# Patient Record
Sex: FEMALE | URBAN - METROPOLITAN AREA
[De-identification: names, ages, dates, MRNs, and addresses within clinical notes are randomized per-mention and may not be internally consistent; named-entity substitution may affect disease eponyms.]

---

## 2023-08-29 ENCOUNTER — EMERGENCY (EMERGENCY)
Facility: HOSPITAL | Age: 38
LOS: 1 days | Discharge: HOME | End: 2023-08-29
Attending: EMERGENCY MEDICINE | Admitting: EMERGENCY MEDICINE
Payer: OTHER MISCELLANEOUS

## 2023-08-29 VITALS
SYSTOLIC BLOOD PRESSURE: 133 MMHG | OXYGEN SATURATION: 98 % | DIASTOLIC BLOOD PRESSURE: 79 MMHG | HEART RATE: 53 BPM | RESPIRATION RATE: 17 BRPM | TEMPERATURE: 99 F

## 2023-08-29 VITALS — HEART RATE: 43 BPM

## 2023-08-29 LAB
ALBUMIN SERPL ELPH-MCNC: 3.7 G/DL — SIGNIFICANT CHANGE UP (ref 3.4–5)
ALP SERPL-CCNC: 102 U/L — SIGNIFICANT CHANGE UP (ref 40–120)
ALT FLD-CCNC: 23 U/L — SIGNIFICANT CHANGE UP (ref 12–42)
AMPHET UR-MCNC: NEGATIVE — SIGNIFICANT CHANGE UP
ANION GAP SERPL CALC-SCNC: 7 MMOL/L — LOW (ref 9–16)
APAP SERPL-MCNC: <2 UG/ML — LOW (ref 10–30)
APPEARANCE UR: CLEAR — SIGNIFICANT CHANGE UP
APTT BLD: 31.2 SEC — SIGNIFICANT CHANGE UP (ref 24.5–35.6)
AST SERPL-CCNC: 18 U/L — SIGNIFICANT CHANGE UP (ref 15–37)
BARBITURATES UR SCN-MCNC: NEGATIVE — SIGNIFICANT CHANGE UP
BASOPHILS # BLD AUTO: 0.04 K/UL — SIGNIFICANT CHANGE UP (ref 0–0.2)
BASOPHILS NFR BLD AUTO: 0.6 % — SIGNIFICANT CHANGE UP (ref 0–2)
BENZODIAZ UR-MCNC: NEGATIVE — SIGNIFICANT CHANGE UP
BILIRUB SERPL-MCNC: 0.4 MG/DL — SIGNIFICANT CHANGE UP (ref 0.2–1.2)
BILIRUB UR-MCNC: NEGATIVE — SIGNIFICANT CHANGE UP
BUN SERPL-MCNC: 11 MG/DL — SIGNIFICANT CHANGE UP (ref 7–23)
CALCIUM SERPL-MCNC: 9.1 MG/DL — SIGNIFICANT CHANGE UP (ref 8.5–10.5)
CHLORIDE SERPL-SCNC: 105 MMOL/L — SIGNIFICANT CHANGE UP (ref 96–108)
CO2 SERPL-SCNC: 28 MMOL/L — SIGNIFICANT CHANGE UP (ref 22–31)
COCAINE METAB.OTHER UR-MCNC: NEGATIVE — SIGNIFICANT CHANGE UP
COLOR SPEC: YELLOW — SIGNIFICANT CHANGE UP
CREAT SERPL-MCNC: 0.72 MG/DL — SIGNIFICANT CHANGE UP (ref 0.5–1.3)
DIFF PNL FLD: NEGATIVE — SIGNIFICANT CHANGE UP
EGFR: 108 ML/MIN/1.73M2 — SIGNIFICANT CHANGE UP
EOSINOPHIL # BLD AUTO: 0.11 K/UL — SIGNIFICANT CHANGE UP (ref 0–0.5)
EOSINOPHIL NFR BLD AUTO: 1.8 % — SIGNIFICANT CHANGE UP (ref 0–6)
ETHANOL SERPL-MCNC: <3 MG/DL — SIGNIFICANT CHANGE UP
FLUAV AG NPH QL: SIGNIFICANT CHANGE UP
FLUBV AG NPH QL: SIGNIFICANT CHANGE UP
GLUCOSE SERPL-MCNC: 80 MG/DL — SIGNIFICANT CHANGE UP (ref 70–99)
GLUCOSE UR QL: NEGATIVE MG/DL — SIGNIFICANT CHANGE UP
HCG SERPL-ACNC: <1 MIU/ML — SIGNIFICANT CHANGE UP
HCT VFR BLD CALC: 38.4 % — SIGNIFICANT CHANGE UP (ref 34.5–45)
HGB BLD-MCNC: 12.7 G/DL — SIGNIFICANT CHANGE UP (ref 11.5–15.5)
HIV 1 & 2 AB SERPL IA.RAPID: SIGNIFICANT CHANGE UP
IMM GRANULOCYTES NFR BLD AUTO: 0.3 % — SIGNIFICANT CHANGE UP (ref 0–0.9)
INR BLD: 1.01 — SIGNIFICANT CHANGE UP (ref 0.85–1.18)
KETONES UR-MCNC: NEGATIVE MG/DL — SIGNIFICANT CHANGE UP
LEUKOCYTE ESTERASE UR-ACNC: NEGATIVE — SIGNIFICANT CHANGE UP
LYMPHOCYTES # BLD AUTO: 2.74 K/UL — SIGNIFICANT CHANGE UP (ref 1–3.3)
LYMPHOCYTES # BLD AUTO: 44.3 % — HIGH (ref 13–44)
MAGNESIUM SERPL-MCNC: 2 MG/DL — SIGNIFICANT CHANGE UP (ref 1.6–2.6)
MCHC RBC-ENTMCNC: 29.9 PG — SIGNIFICANT CHANGE UP (ref 27–34)
MCHC RBC-ENTMCNC: 33.1 GM/DL — SIGNIFICANT CHANGE UP (ref 32–36)
MCV RBC AUTO: 90.4 FL — SIGNIFICANT CHANGE UP (ref 80–100)
METHADONE UR-MCNC: NEGATIVE — SIGNIFICANT CHANGE UP
MONOCYTES # BLD AUTO: 0.42 K/UL — SIGNIFICANT CHANGE UP (ref 0–0.9)
MONOCYTES NFR BLD AUTO: 6.8 % — SIGNIFICANT CHANGE UP (ref 2–14)
NEUTROPHILS # BLD AUTO: 2.85 K/UL — SIGNIFICANT CHANGE UP (ref 1.8–7.4)
NEUTROPHILS NFR BLD AUTO: 46.2 % — SIGNIFICANT CHANGE UP (ref 43–77)
NITRITE UR-MCNC: NEGATIVE — SIGNIFICANT CHANGE UP
NRBC # BLD: 0 /100 WBCS — SIGNIFICANT CHANGE UP (ref 0–0)
OPIATES UR-MCNC: NEGATIVE — SIGNIFICANT CHANGE UP
PCP SPEC-MCNC: SIGNIFICANT CHANGE UP
PCP UR-MCNC: NEGATIVE — SIGNIFICANT CHANGE UP
PH UR: 7.5 — SIGNIFICANT CHANGE UP (ref 5–8)
PHOSPHATE SERPL-MCNC: 2.8 MG/DL — SIGNIFICANT CHANGE UP (ref 2.5–4.5)
PLATELET # BLD AUTO: 259 K/UL — SIGNIFICANT CHANGE UP (ref 150–400)
POTASSIUM SERPL-MCNC: 3.9 MMOL/L — SIGNIFICANT CHANGE UP (ref 3.5–5.3)
POTASSIUM SERPL-SCNC: 3.9 MMOL/L — SIGNIFICANT CHANGE UP (ref 3.5–5.3)
PROT SERPL-MCNC: 7.8 G/DL — SIGNIFICANT CHANGE UP (ref 6.4–8.2)
PROT UR-MCNC: NEGATIVE MG/DL — SIGNIFICANT CHANGE UP
PROTHROM AB SERPL-ACNC: 11.4 SEC — SIGNIFICANT CHANGE UP (ref 9.5–13)
RBC # BLD: 4.25 M/UL — SIGNIFICANT CHANGE UP (ref 3.8–5.2)
RBC # FLD: 12.5 % — SIGNIFICANT CHANGE UP (ref 10.3–14.5)
RSV RNA NPH QL NAA+NON-PROBE: SIGNIFICANT CHANGE UP
SALICYLATES SERPL-MCNC: 0.3 MG/DL — LOW (ref 2.8–20)
SARS-COV-2 RNA SPEC QL NAA+PROBE: SIGNIFICANT CHANGE UP
SODIUM SERPL-SCNC: 140 MMOL/L — SIGNIFICANT CHANGE UP (ref 132–145)
SP GR SPEC: 1.02 — SIGNIFICANT CHANGE UP (ref 1–1.03)
THC UR QL: NEGATIVE — SIGNIFICANT CHANGE UP
TROPONIN I, HIGH SENSITIVITY RESULT: 5.2 NG/L — SIGNIFICANT CHANGE UP
TROPONIN I, HIGH SENSITIVITY RESULT: <4 NG/L — SIGNIFICANT CHANGE UP
TSH SERPL-MCNC: 0.93 UIU/ML — SIGNIFICANT CHANGE UP (ref 0.36–3.74)
UROBILINOGEN FLD QL: 0.2 MG/DL — SIGNIFICANT CHANGE UP (ref 0.2–1)
WBC # BLD: 6.18 K/UL — SIGNIFICANT CHANGE UP (ref 3.8–10.5)
WBC # FLD AUTO: 6.18 K/UL — SIGNIFICANT CHANGE UP (ref 3.8–10.5)

## 2023-08-29 PROCEDURE — 70450 CT HEAD/BRAIN W/O DYE: CPT | Mod: 26

## 2023-08-29 PROCEDURE — 71045 X-RAY EXAM CHEST 1 VIEW: CPT | Mod: 26

## 2023-08-29 PROCEDURE — 99291 CRITICAL CARE FIRST HOUR: CPT

## 2023-08-29 RX ORDER — SODIUM CHLORIDE 9 MG/ML
1000 INJECTION INTRAMUSCULAR; INTRAVENOUS; SUBCUTANEOUS ONCE
Refills: 0 | Status: COMPLETED | OUTPATIENT
Start: 2023-08-29 | End: 2023-08-29

## 2023-08-29 RX ADMIN — Medication 2 MILLIGRAM(S): at 12:10

## 2023-08-29 RX ADMIN — SODIUM CHLORIDE 1000 MILLILITER(S): 9 INJECTION INTRAMUSCULAR; INTRAVENOUS; SUBCUTANEOUS at 12:10

## 2023-08-29 NOTE — ED ADULT NURSE REASSESSMENT NOTE - NS ED NURSE REASSESS COMMENT FT1
Reassesement done. Patient AOX4 at this time, reports decrease in her pain but reports a feeling of wooziness. VSS

## 2023-08-29 NOTE — ED PROVIDER NOTE - OBJECTIVE STATEMENT
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  Patient was brought in by EMS, as per family patient's had similar symptoms in the past usually coincide with panic attacks.  On initial presentation patient was mumbling incomprehensible words but after several minutes and 2 mg of Ativan patient is now back to baseline and answering all questions appropriately.  Patient endorsing chest pain and mild lower abdominal pain for the last 3 days.  Denies fever, nausea, vomiting, shortness of breath, dysuria, diarrhea.

## 2023-08-29 NOTE — ED ADULT TRIAGE NOTE - CHIEF COMPLAINT QUOTE
notification PTA by EMS for AMS; picked up from work with AMS/panic attack; hx panic attacks and had a similar episode a few weeks ago; incomprehensible speech and jerking body movements noticed on stretcher; NRB in place; BGL 82 on scene and noted to be bradycardic at 42; clinical upgrade to room 11 called overhead on arrival

## 2023-08-29 NOTE — ED ADULT NURSE NOTE - OBJECTIVE STATEMENT
Pt BIBA,initiatlly  non responsive to external stimuli. Patient reportedly has hx afib and has implantable device. NKDA.  Bilateral large bore IV initiated, blood and labs sent and 2mg ativan given as ordered and 1LNS given bolus per MD order. Pads placed on patient and patient cognition improved. Able to communicate with providers. Patient reported 7/10 cp. Pt BIBA,initiatlly  non responsive to external stimuli. Patient reportedly has hx afib and has implantable device. NKDA.  Bilateral large bore IV initiated, blood and labs sent and 2mg ativan given as ordered and 1LNS given bolus per MD order. Pads placed on patient and patient connected to hospital approved zoll monitor, and patient cognition improved. Patient  Able to communicate with providers. Patient reported 7/10 cp.

## 2023-08-29 NOTE — ED ADULT NURSE REASSESSMENT NOTE - NS ED NURSE REASSESS COMMENT FT1
Patient reported weakness and loss of sensation on right side. NIHSS done and resident at bedside. Patient reported weakness and loss of sensation on right side. NIHSS done by resident at bedside.

## 2023-08-29 NOTE — ED ADULT NURSE NOTE - NSFALLRISKINTERV_ED_ALL_ED
Assistance OOB with selected safe patient handling equipment if applicable/Assistance with ambulation/Communicate fall risk and risk factors to all staff, patient, and family/Encourage patient to sit up slowly, dangle for a short time, stand at bedside before walking/Monitor gait and stability/Orthostatic vital signs/Provide visual cue: yellow wristband, yellow gown, etc/Reinforce activity limits and safety measures with patient and family/Toileting schedule using arm’s reach rule for commode and bathroom/Call bell, personal items and telephone in reach/Instruct patient to call for assistance before getting out of bed/chair/stretcher/Non-slip footwear applied when patient is off stretcher/[place] to call system/Physically safe environment - no spills, clutter or unnecessary equipment/Purposeful Proactive Rounding/Room/bathroom lighting operational, light cord in reach Assistance OOB with selected safe patient handling equipment if applicable/Assistance with ambulation/Communicate fall risk and risk factors to all staff, patient, and family/Encourage patient to sit up slowly, dangle for a short time, stand at bedside before walking/Monitor gait and stability/Orthostatic vital signs/Provide visual cue: yellow wristband, yellow gown, etc/Reinforce activity limits and safety measures with patient and family/Toileting schedule using arm’s reach rule for commode and bathroom/Call bell, personal items and telephone in reach/Instruct patient to call for assistance before getting out of bed/chair/stretcher/Non-slip footwear applied when patient is off stretcher/[place] to call system/Physically safe environment - no spills, clutter or unnecessary equipment/Purposeful Proactive Rounding/Room/bathroom lighting operational, light cord in reach Assistance OOB with selected safe patient handling equipment if applicable/Assistance with ambulation/Communicate fall risk and risk factors to all staff, patient, and family/Encourage patient to sit up slowly, dangle for a short time, stand at bedside before walking/Monitor gait and stability/Orthostatic vital signs/Provide visual cue: yellow wristband, yellow gown, etc/Reinforce activity limits and safety measures with patient and family/Toileting schedule using arm’s reach rule for commode and bathroom/Call bell, personal items and telephone in reach/Instruct patient to call for assistance before getting out of bed/chair/stretcher/Non-slip footwear applied when patient is off stretcher/[place] to call system/Physically safe environment - no spills, clutter or unnecessary equipment/Purposeful Proactive Rounding/Room/bathroom lighting operational, light cord in reach

## 2023-08-29 NOTE — ED PROVIDER NOTE - CRITICAL CARE ATTENDING CONTRIBUTION TO CARE
Patient is a 38-year-old female with a history of anxiety disorder and atrial fibrillation status post ablation who is brought in by EMS for altered mental status.  History obtained from EMS and patient's  initially as she was unable to provide history upon arrival.  As per  patient reports severe anxiety while at work today and called him to come pick her up.  While he was on his way her symptoms worsened and she developed incomprehensible speech and intermittent shaking.  Given clonic activity patient was given 2 mg of Ativan upon arrival for possible seizure and patient regained mental status very quickly after administration.  Patient reports the symptoms were consistent with a panic attack, similar to what she has had in the past.  It was also noted that patient has a history of bradycardia and reports a normal heart rate is around 40-42.  Patient denies any fevers, nausea, vomiting, shortness of breath, dysuria, diarrhea.  Review of system– Agree with above.  Physical exam–agree with above.  Neurologically intact.  Assessment/plan.  We will perform an altered mental status work-up.  Work-up will include full labs, EKG, chest x-ray, U tox, serum tox, and head CT.  Patient responded well to Ativan.  We will continue to monitor and perform serial checks.  EKG is concerning for sinus bradycardia with T wave inversions.  Given T wave inversions we will perform serial troponins.  Patient reports bradycardia is her baseline.    The patient was seen immediately upon arrival due to a high probability of imminent or life-threatening deterioration secondary to AMS, which required my direct attention, intervention, and personal management at the bedside. I have personally provided critical care time exclusive of time spent on separately billable procedures. Time includes review of laboratory data, radiology results, discussion with consultants, discussion with the patient's family, and monitoring for potential decompensation.

## 2023-08-29 NOTE — ED ADULT NURSE REASSESSMENT NOTE - NS ED NURSE REASSESS COMMENT FT1
Patient brought to CT in bed on portable cardiac monitor and on pads. Accompanied by RN. CLARICE and patient remains AOX4 at this time.

## 2023-08-29 NOTE — ED PROVIDER NOTE - PROGRESS NOTE DETAILS
Jose Martinez, DO PGY-3: Patient's labs are nonactionable, repeat troponin negative, CT negative, chest x-ray negative, urine negative, tox screen negative.  Patient well-appearing, feeling better, tolerated p.o.  Will discharge patient to follow-up closely with PMD.

## 2023-08-29 NOTE — ED PROVIDER NOTE - PHYSICAL EXAMINATION
GEN: Patient initially tremulous but now awake and alert but lethargic.   HEENT: normocephalic, atraumatic, EOMI, PERRL, no scleral icterus, moist MM  CARDIAC: bradycardic, S1, S2, no murmur.   PULM: CTA B/L no wheeze, rhonchi, rales.   ABD: soft, mild suprapubic ttp, ND, no rebound no guarding  MSK: Moving all extremities, no edema.   NEURO: A&Ox3, no focal neurological deficits  SKIN: warm, dry, no rash.

## 2023-08-29 NOTE — ED PROVIDER NOTE - PATIENT PORTAL LINK FT
You can access the FollowMyHealth Patient Portal offered by [facility] by registering at the following website: [email or web address]. By joining [employer]’s FollowMyHealth portal, you will also be able to view your health information using other applications (apps) compatible with our system. You can access the FollowMyHealth Patient Portal offered by [facility] by registering at the following website: [email or web address]. By joining [employer]’s FollowMyHealth portal, you will also be able to view your health information using other applications (apps) compatible with our system. You can access the FollowMyHealth Patient Portal offered by [facility] by registering at the following website: [email or web address]. By joining [employer]’s FollowMyHealth portal, you will also be able to view your health information using other applications (apps) compatible with our system.

## 2023-08-29 NOTE — ED ADULT NURSE REASSESSMENT NOTE - NS ED NURSE REASSESS COMMENT FT1
Patient reported that she has experienced episodes like this before. Reported a normal HR in the 40's. MD johnson

## 2023-08-29 NOTE — ED PROVIDER NOTE - NSFOLLOWUPINSTRUCTIONS_ED_ALL_ED_FT
1. Please follow up with your primary care doctor within the next week.    Altered Mental Status    WHAT YOU NEED TO KNOW:    What is altered mental status? Altered mental status (AMS) is a disruption in how your brain works that causes a change in behavior. This change can happen suddenly or over days. AMS ranges from slight confusion to total disorientation and increased sleepiness to coma.    What causes AMS? AMS can be caused by physical, psychological, and environmental factors. In older adults, AMS may be caused by a combination of these factors. The following are some of the most common causes of AMS:    Diseases or conditions in the body that can affect your brain and nervous system:  Hypoxia (low oxygen levels)    Low or high blood sugar levels, or diabetic ketoacidosis    Heart attack    Dehydration, low or high blood sodium levels    Thyroid or adrenal gland disease    Urinary tract infection or renal failure    Diseases or conditions within your skull:  Seizures    Head traumas, concussions    Brain tumors or strokes    Brain disease, such as encephalopathy    High altitude cerebral edema (brain tissue swelling at high altitude)    Other factors:  Burns    Hypothermia (low body temperature), hyperthermia (high body temperature), or heat stroke    Toxic plants, such as mushrooms    Carbon monoxide    Drug or alcohol withdrawal    Psychiatric problems  What factors increase the risk for AMS?    Older adults may have AMS after changes in their medicines, heart attacks, or hip fractures. Infections, such as urinary tract infections or pneumonia, can also increase the risk for AMS.    A medical history of high blood pressure, heart problems, diabetes, or psychiatric illness increases the risk for AMS.  What are the signs and symptoms of AMS? You, or those close to you, will notice changes in how you think and in your awareness, movements, and routines. The following are some of the more common signs:    Lack of concentration or forgetfulness    Slow responses    Hallucinations and changes in sleep patterns    Decreased or increased movement    Agitation or rambling speech    Cannot or will not follow reasonable requests    Cannot be aroused from sleep  How is AMS diagnosed? Your healthcare provider will do a physical exam and ask you and your family about your usual mental status. The provider will want know if the changes happened suddenly or over time. Tell the provider about recent events, such as falls or other traumas or illnesses. Witnesses may be asked about your behavior. Your provider will ask about the medicines you take and any problems with substance abuse. You may also need the following tests to learn the cause of your AMS:    A neurological exam tells healthcare providers if you are having problems with your brain or nerves. During the exam, your reflexes will be tested.    Vital signs give healthcare providers information about your current health. Healthcare providers will check your blood pressure, heart rate, breathing rate, and temperature. They will also ask about your pain. They will measure the amount of oxygen in your blood with a device called a pulse oximeter.    Blood tests are done to check the function of your liver, kidneys, and lungs. They will also show if you have an infection or other toxins in your body. Your blood glucose level will also be checked.    A chest x-ray is a picture of your lungs and heart. Healthcare providers use the x-ray to look for signs of infection, such as pneumonia, that could be causing your AMS.    A CT scan is also called a CAT scan. An x-ray machine uses a computer to take pictures of your head. The pictures may show a tumor, swelling, or bleeding on the brain.    A lumbar puncture is also called a spinal tap. During a lumbar puncture, you will need to lie still. Healthcare providers may give you medicine to numb a small area of your back. A needle will be put in and fluid will be removed from around your spinal cord. The fluid will be sent to a lab for tests. The tests check for infection, bleeding around your brain and spinal cord, or other problems.  How is AMS treated? Treatment will depend on the cause of your AMS. Treatments with oxygen and medicines may be needed to improve your symptoms. You may be placed in the hospital if your symptoms are severe.    When should I or someone close to me contact my healthcare provider?    You have sudden changes in behavior.    You are more sleepy or confused than usual.    You have questions or concerns about your condition or care.  When should I or someone close to me seek immediate care?    Your speech is slurred or you are rambling.    You have a seizure.    You are not able to move any part of your body freely.    You cannot be woken up.  CARE AGREEMENT:    You have the right to help plan your care. Learn about your health condition and how it may be treated. Discuss treatment options with your healthcare providers to decide what care you want to receive. You always have the right to refuse treatment.

## 2023-08-29 NOTE — ED PROVIDER NOTE - CLINICAL SUMMARY MEDICAL DECISION MAKING FREE TEXT BOX
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  Patient was brought in by EMS, as per family patient's had similar symptoms in the past usually coincide with panic attacks.  On initial presentation patient was mumbling incomprehensible words but after several minutes and 2 mg of Ativan patient is now back to baseline and answering all questions appropriately.  Patient endorsing chest pain and mild lower abdominal pain for the last 3 days.  Denies fever, nausea, vomiting, shortness of breath, dysuria, diarrhea. Initial presentation patient was tremulous in all 4 extremities and mumbling incomprehensible words but was following basic commands.  Patient given 2 mg of Ativan with symptom resolution.  Patient bradycardic with normal blood pressure, patient states her normal heart rate is 45.  Differential is broad but includes ICH, ACS, infection, electrolyte abnormality, tox.  Plan for labs, chest x-ray, CT head, urine, tox screen.  Reassess

## 2023-08-30 DIAGNOSIS — Z11.3 ENCOUNTER FOR SCREENING FOR INFECTIONS WITH A PREDOMINANTLY SEXUAL MODE OF TRANSMISSION: ICD-10-CM

## 2023-08-30 DIAGNOSIS — Z23 ENCOUNTER FOR IMMUNIZATION: ICD-10-CM

## 2023-08-30 DIAGNOSIS — R41.0 DISORIENTATION, UNSPECIFIED: ICD-10-CM

## 2023-08-30 DIAGNOSIS — Z13.1 ENCOUNTER FOR SCREENING FOR DIABETES MELLITUS: ICD-10-CM

## 2023-08-30 PROBLEM — Z00.00 ENCOUNTER FOR PREVENTIVE HEALTH EXAMINATION: Status: ACTIVE | Noted: 2023-08-30

## 2023-08-31 DIAGNOSIS — Z86.79 PERSONAL HISTORY OF OTHER DISEASES OF THE CIRCULATORY SYSTEM: ICD-10-CM

## 2023-08-31 NOTE — HISTORY OF PRESENT ILLNESS
[FreeTextEntry1] : 38-year-old female with history of panic anxiety disorder and atrial fibrillation s/p ablation procedure presents to establish medical care and for initial examination [de-identified] : She was seen earlier this week in the emergency room for global altered mental status consistent with delirium.  No focal findings.  Partial toxicology screen was negative.  Accompanying family members indicated that she has had multiple prior episodes, all of which have been attributed to acute panic exacerbations.  Patient discharged from ER after her delirium cleared without specific treatment.

## 2023-08-31 NOTE — ASSESSMENT
[FreeTextEntry1] : Health Maintenance Her BMI is good and no weight loss is currently recommended. Daily aerobic exercises strongly recommended. No STD risk or substance abuse per patient report. Occasional gender specific self-examination is suggested. HIV antibody sent with patient approval. No depression. Competent with ADLs. Colonoscopy is not due this year. Yearly flu vaccine is recommended.  Panic anxiety disorder

## 2023-09-01 ENCOUNTER — APPOINTMENT (OUTPATIENT)
Dept: INTERNAL MEDICINE | Facility: CLINIC | Age: 38
End: 2023-09-01

## 2023-09-01 DIAGNOSIS — R41.82 ALTERED MENTAL STATUS, UNSPECIFIED: ICD-10-CM

## 2023-09-01 DIAGNOSIS — F41.9 ANXIETY DISORDER, UNSPECIFIED: ICD-10-CM

## 2023-09-01 DIAGNOSIS — R10.30 LOWER ABDOMINAL PAIN, UNSPECIFIED: ICD-10-CM

## 2023-09-01 DIAGNOSIS — R25.1 TREMOR, UNSPECIFIED: ICD-10-CM

## 2023-09-01 DIAGNOSIS — R07.9 CHEST PAIN, UNSPECIFIED: ICD-10-CM

## 2023-09-01 DIAGNOSIS — I48.91 UNSPECIFIED ATRIAL FIBRILLATION: ICD-10-CM

## 2023-09-01 DIAGNOSIS — R00.1 BRADYCARDIA, UNSPECIFIED: ICD-10-CM

## 2023-09-05 ENCOUNTER — APPOINTMENT (OUTPATIENT)
Dept: HEART AND VASCULAR | Facility: CLINIC | Age: 38
End: 2023-09-05
Payer: MEDICAID

## 2023-09-05 VITALS
DIASTOLIC BLOOD PRESSURE: 85 MMHG | TEMPERATURE: 98.2 F | OXYGEN SATURATION: 98 % | WEIGHT: 176.44 LBS | HEART RATE: 55 BPM | BODY MASS INDEX: 32.47 KG/M2 | SYSTOLIC BLOOD PRESSURE: 123 MMHG | HEIGHT: 62 IN

## 2023-09-05 PROCEDURE — 93000 ELECTROCARDIOGRAM COMPLETE: CPT

## 2023-09-05 PROCEDURE — 99204 OFFICE O/P NEW MOD 45 MIN: CPT | Mod: 25

## 2023-09-05 NOTE — DISCUSSION/SUMMARY
[FreeTextEntry1] : BRIDGER WALKIRIS and I had a discussion of his symptoms. Her risk for CAD falls intro intermediate. We will therefore move forward with a stress ekg and echocardiogram at this time to evaluate for obstructive CAD and risk stratification, provided an insurance approval can be obtained. My preference would be stress echocardiogram, but it is unlikely to be approved at this time. Risks and benefits discussed. EKG SB no st t changes  [EKG obtained to assist in diagnosis and management of assessed problem(s)] : EKG obtained to assist in diagnosis and management of assessed problem(s)

## 2023-09-05 NOTE — REASON FOR VISIT
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  Patient was brought in by EMS, as per family patient's had similar symptoms in the past usually coincide with panic attacks.  On initial presentation patient was mumbling incomprehensible words but after several minutes and 2 mg of Ativan patient is now back to baseline and answering all questions appropriately.  Patient endorsing chest pain and mild lower abdominal pain for the last 3 days.  Denies fever, nausea, vomiting, shortness of breath, dysuria, diarrhea.

## 2023-09-08 ENCOUNTER — APPOINTMENT (OUTPATIENT)
Dept: OBGYN | Facility: CLINIC | Age: 38
End: 2023-09-08

## 2023-11-19 ENCOUNTER — EMERGENCY (EMERGENCY)
Facility: HOSPITAL | Age: 38
LOS: 1 days | Discharge: HOME | End: 2023-11-19
Attending: EMERGENCY MEDICINE | Admitting: EMERGENCY MEDICINE
Payer: MEDICAID

## 2023-11-19 ENCOUNTER — TRANSCRIPTION ENCOUNTER (OUTPATIENT)
Age: 38
End: 2023-11-19

## 2023-11-19 ENCOUNTER — INPATIENT (INPATIENT)
Facility: HOSPITAL | Age: 38
LOS: 1 days | Discharge: HOME | DRG: 310 | End: 2023-11-21
Attending: INTERNAL MEDICINE | Admitting: INTERNAL MEDICINE
Payer: MEDICAID

## 2023-11-19 VITALS
DIASTOLIC BLOOD PRESSURE: 115 MMHG | TEMPERATURE: 98 F | SYSTOLIC BLOOD PRESSURE: 148 MMHG | HEIGHT: 62 IN | HEART RATE: 112 BPM | WEIGHT: 169.98 LBS | OXYGEN SATURATION: 100 % | RESPIRATION RATE: 20 BRPM

## 2023-11-19 VITALS
TEMPERATURE: 98 F | HEART RATE: 87 BPM | RESPIRATION RATE: 16 BRPM | SYSTOLIC BLOOD PRESSURE: 118 MMHG | HEIGHT: 62 IN | OXYGEN SATURATION: 97 % | DIASTOLIC BLOOD PRESSURE: 68 MMHG

## 2023-11-19 VITALS
DIASTOLIC BLOOD PRESSURE: 93 MMHG | HEART RATE: 86 BPM | OXYGEN SATURATION: 95 % | RESPIRATION RATE: 18 BRPM | SYSTOLIC BLOOD PRESSURE: 131 MMHG

## 2023-11-19 DIAGNOSIS — R55 SYNCOPE AND COLLAPSE: ICD-10-CM

## 2023-11-19 DIAGNOSIS — I48.91 UNSPECIFIED ATRIAL FIBRILLATION: ICD-10-CM

## 2023-11-19 LAB
ALBUMIN SERPL ELPH-MCNC: 3.6 G/DL — SIGNIFICANT CHANGE UP (ref 3.4–5)
ALBUMIN SERPL ELPH-MCNC: 3.8 G/DL — SIGNIFICANT CHANGE UP (ref 3.4–5)
ALP SERPL-CCNC: 109 U/L — SIGNIFICANT CHANGE UP (ref 40–120)
ALP SERPL-CCNC: 97 U/L — SIGNIFICANT CHANGE UP (ref 40–120)
ALT FLD-CCNC: 21 U/L — SIGNIFICANT CHANGE UP (ref 12–42)
ALT FLD-CCNC: 23 U/L — SIGNIFICANT CHANGE UP (ref 12–42)
ANION GAP SERPL CALC-SCNC: 11 MMOL/L — SIGNIFICANT CHANGE UP (ref 9–16)
ANION GAP SERPL CALC-SCNC: 8 MMOL/L — LOW (ref 9–16)
AST SERPL-CCNC: 16 U/L — SIGNIFICANT CHANGE UP (ref 15–37)
AST SERPL-CCNC: 17 U/L — SIGNIFICANT CHANGE UP (ref 15–37)
BASOPHILS # BLD AUTO: 0.03 K/UL — SIGNIFICANT CHANGE UP (ref 0–0.2)
BASOPHILS NFR BLD AUTO: 0.3 % — SIGNIFICANT CHANGE UP (ref 0–2)
BILIRUB SERPL-MCNC: 0.2 MG/DL — SIGNIFICANT CHANGE UP (ref 0.2–1.2)
BILIRUB SERPL-MCNC: 0.4 MG/DL — SIGNIFICANT CHANGE UP (ref 0.2–1.2)
BUN SERPL-MCNC: 10 MG/DL — SIGNIFICANT CHANGE UP (ref 7–23)
BUN SERPL-MCNC: 15 MG/DL — SIGNIFICANT CHANGE UP (ref 7–23)
CALCIUM SERPL-MCNC: 8.9 MG/DL — SIGNIFICANT CHANGE UP (ref 8.5–10.5)
CALCIUM SERPL-MCNC: 9.1 MG/DL — SIGNIFICANT CHANGE UP (ref 8.5–10.5)
CHLORIDE SERPL-SCNC: 104 MMOL/L — SIGNIFICANT CHANGE UP (ref 96–108)
CO2 SERPL-SCNC: 25 MMOL/L — SIGNIFICANT CHANGE UP (ref 22–31)
CO2 SERPL-SCNC: 27 MMOL/L — SIGNIFICANT CHANGE UP (ref 22–31)
CREAT SERPL-MCNC: 0.65 MG/DL — SIGNIFICANT CHANGE UP (ref 0.5–1.3)
CREAT SERPL-MCNC: 0.72 MG/DL — SIGNIFICANT CHANGE UP (ref 0.5–1.3)
EGFR: 108 ML/MIN/1.73M2 — SIGNIFICANT CHANGE UP
EGFR: 114 ML/MIN/1.73M2 — SIGNIFICANT CHANGE UP
EOSINOPHIL # BLD AUTO: 0.09 K/UL — SIGNIFICANT CHANGE UP (ref 0–0.5)
EOSINOPHIL NFR BLD AUTO: 1 % — SIGNIFICANT CHANGE UP (ref 0–6)
FLUAV AG NPH QL: SIGNIFICANT CHANGE UP
FLUBV AG NPH QL: SIGNIFICANT CHANGE UP
GLUCOSE SERPL-MCNC: 100 MG/DL — HIGH (ref 70–99)
GLUCOSE SERPL-MCNC: 101 MG/DL — HIGH (ref 70–99)
HCG SERPL-ACNC: <1 MIU/ML — SIGNIFICANT CHANGE UP
HCT VFR BLD CALC: 37.3 % — SIGNIFICANT CHANGE UP (ref 34.5–45)
HCT VFR BLD CALC: 40.7 % — SIGNIFICANT CHANGE UP (ref 34.5–45)
HGB BLD-MCNC: 12.4 G/DL — SIGNIFICANT CHANGE UP (ref 11.5–15.5)
HGB BLD-MCNC: 13.3 G/DL — SIGNIFICANT CHANGE UP (ref 11.5–15.5)
IMM GRANULOCYTES NFR BLD AUTO: 0.3 % — SIGNIFICANT CHANGE UP (ref 0–0.9)
LIDOCAIN IGE QN: 22 U/L — SIGNIFICANT CHANGE UP (ref 16–77)
LYMPHOCYTES # BLD AUTO: 2.32 K/UL — SIGNIFICANT CHANGE UP (ref 1–3.3)
LYMPHOCYTES # BLD AUTO: 26.9 % — SIGNIFICANT CHANGE UP (ref 13–44)
MAGNESIUM SERPL-MCNC: 2 MG/DL — SIGNIFICANT CHANGE UP (ref 1.6–2.6)
MCHC RBC-ENTMCNC: 29.8 PG — SIGNIFICANT CHANGE UP (ref 27–34)
MCHC RBC-ENTMCNC: 30.3 PG — SIGNIFICANT CHANGE UP (ref 27–34)
MCHC RBC-ENTMCNC: 32.7 GM/DL — SIGNIFICANT CHANGE UP (ref 32–36)
MCHC RBC-ENTMCNC: 33.2 GM/DL — SIGNIFICANT CHANGE UP (ref 32–36)
MCV RBC AUTO: 91.1 FL — SIGNIFICANT CHANGE UP (ref 80–100)
MCV RBC AUTO: 91.2 FL — SIGNIFICANT CHANGE UP (ref 80–100)
MONOCYTES # BLD AUTO: 0.5 K/UL — SIGNIFICANT CHANGE UP (ref 0–0.9)
MONOCYTES NFR BLD AUTO: 5.8 % — SIGNIFICANT CHANGE UP (ref 2–14)
NEUTROPHILS # BLD AUTO: 5.64 K/UL — SIGNIFICANT CHANGE UP (ref 1.8–7.4)
NEUTROPHILS NFR BLD AUTO: 65.7 % — SIGNIFICANT CHANGE UP (ref 43–77)
NRBC # BLD: 0 /100 WBCS — SIGNIFICANT CHANGE UP (ref 0–0)
NT-PROBNP SERPL-SCNC: 583 PG/ML — HIGH
PLATELET # BLD AUTO: 266 K/UL — SIGNIFICANT CHANGE UP (ref 150–400)
PLATELET # BLD AUTO: 269 K/UL — SIGNIFICANT CHANGE UP (ref 150–400)
POTASSIUM SERPL-MCNC: 3.6 MMOL/L — SIGNIFICANT CHANGE UP (ref 3.5–5.3)
POTASSIUM SERPL-MCNC: 3.8 MMOL/L — SIGNIFICANT CHANGE UP (ref 3.5–5.3)
POTASSIUM SERPL-SCNC: 3.6 MMOL/L — SIGNIFICANT CHANGE UP (ref 3.5–5.3)
POTASSIUM SERPL-SCNC: 3.8 MMOL/L — SIGNIFICANT CHANGE UP (ref 3.5–5.3)
PROT SERPL-MCNC: 7.9 G/DL — SIGNIFICANT CHANGE UP (ref 6.4–8.2)
PROT SERPL-MCNC: 8.1 G/DL — SIGNIFICANT CHANGE UP (ref 6.4–8.2)
RBC # BLD: 4.09 M/UL — SIGNIFICANT CHANGE UP (ref 3.8–5.2)
RBC # BLD: 4.47 M/UL — SIGNIFICANT CHANGE UP (ref 3.8–5.2)
RBC # FLD: 12.7 % — SIGNIFICANT CHANGE UP (ref 10.3–14.5)
RBC # FLD: 12.8 % — SIGNIFICANT CHANGE UP (ref 10.3–14.5)
RSV RNA NPH QL NAA+NON-PROBE: SIGNIFICANT CHANGE UP
SARS-COV-2 RNA SPEC QL NAA+PROBE: SIGNIFICANT CHANGE UP
SODIUM SERPL-SCNC: 139 MMOL/L — SIGNIFICANT CHANGE UP (ref 132–145)
SODIUM SERPL-SCNC: 140 MMOL/L — SIGNIFICANT CHANGE UP (ref 132–145)
TROPONIN I, HIGH SENSITIVITY RESULT: 5.2 NG/L — SIGNIFICANT CHANGE UP
TROPONIN I, HIGH SENSITIVITY RESULT: <4 NG/L — SIGNIFICANT CHANGE UP
TSH SERPL-MCNC: 1.67 UIU/ML — SIGNIFICANT CHANGE UP (ref 0.36–3.74)
WBC # BLD: 8.61 K/UL — SIGNIFICANT CHANGE UP (ref 3.8–10.5)
WBC # BLD: 8.67 K/UL — SIGNIFICANT CHANGE UP (ref 3.8–10.5)
WBC # FLD AUTO: 8.61 K/UL — SIGNIFICANT CHANGE UP (ref 3.8–10.5)
WBC # FLD AUTO: 8.67 K/UL — SIGNIFICANT CHANGE UP (ref 3.8–10.5)

## 2023-11-19 PROCEDURE — 99291 CRITICAL CARE FIRST HOUR: CPT | Mod: 25

## 2023-11-19 PROCEDURE — 71045 X-RAY EXAM CHEST 1 VIEW: CPT | Mod: 26

## 2023-11-19 PROCEDURE — 92960 CARDIOVERSION ELECTRIC EXT: CPT | Mod: 59

## 2023-11-19 PROCEDURE — 99285 EMERGENCY DEPT VISIT HI MDM: CPT

## 2023-11-19 RX ORDER — MORPHINE SULFATE 50 MG/1
2 CAPSULE, EXTENDED RELEASE ORAL ONCE
Refills: 0 | Status: DISCONTINUED | OUTPATIENT
Start: 2023-11-19 | End: 2023-11-19

## 2023-11-19 RX ORDER — DILTIAZEM HCL 120 MG
60 CAPSULE, EXT RELEASE 24 HR ORAL ONCE
Refills: 0 | Status: COMPLETED | OUTPATIENT
Start: 2023-11-19 | End: 2023-11-19

## 2023-11-19 RX ORDER — SODIUM CHLORIDE 9 MG/ML
1000 INJECTION INTRAMUSCULAR; INTRAVENOUS; SUBCUTANEOUS ONCE
Refills: 0 | Status: COMPLETED | OUTPATIENT
Start: 2023-11-19 | End: 2023-11-19

## 2023-11-19 RX ORDER — DILTIAZEM HCL 120 MG
30 CAPSULE, EXT RELEASE 24 HR ORAL ONCE
Refills: 0 | Status: COMPLETED | OUTPATIENT
Start: 2023-11-19 | End: 2023-11-19

## 2023-11-19 RX ORDER — DILTIAZEM HCL 120 MG
1 CAPSULE, EXT RELEASE 24 HR ORAL
Qty: 42 | Refills: 0
Start: 2023-11-19 | End: 2023-12-09

## 2023-11-19 RX ORDER — DILTIAZEM HCL 120 MG
20 CAPSULE, EXT RELEASE 24 HR ORAL ONCE
Refills: 0 | Status: COMPLETED | OUTPATIENT
Start: 2023-11-19 | End: 2023-11-19

## 2023-11-19 RX ORDER — DILTIAZEM HCL 120 MG
1 CAPSULE, EXT RELEASE 24 HR ORAL
Qty: 21 | Refills: 0
Start: 2023-11-19 | End: 2023-12-09

## 2023-11-19 RX ORDER — APIXABAN 2.5 MG/1
1 TABLET, FILM COATED ORAL
Qty: 21 | Refills: 0
Start: 2023-11-19 | End: 2023-12-09

## 2023-11-19 RX ORDER — INFLUENZA VIRUS VACCINE 15; 15; 15; 15 UG/.5ML; UG/.5ML; UG/.5ML; UG/.5ML
0.5 SUSPENSION INTRAMUSCULAR ONCE
Refills: 0 | Status: DISCONTINUED | OUTPATIENT
Start: 2023-11-19 | End: 2023-11-21

## 2023-11-19 RX ORDER — APIXABAN 2.5 MG/1
5 TABLET, FILM COATED ORAL ONCE
Refills: 0 | Status: COMPLETED | OUTPATIENT
Start: 2023-11-19 | End: 2023-11-19

## 2023-11-19 RX ORDER — MORPHINE SULFATE 50 MG/1
6 CAPSULE, EXTENDED RELEASE ORAL ONCE
Refills: 0 | Status: DISCONTINUED | OUTPATIENT
Start: 2023-11-19 | End: 2023-11-19

## 2023-11-19 RX ORDER — DILTIAZEM HCL 120 MG
10 CAPSULE, EXT RELEASE 24 HR ORAL ONCE
Refills: 0 | Status: COMPLETED | OUTPATIENT
Start: 2023-11-19 | End: 2023-11-19

## 2023-11-19 RX ADMIN — MORPHINE SULFATE 2 MILLIGRAM(S): 50 CAPSULE, EXTENDED RELEASE ORAL at 01:55

## 2023-11-19 RX ADMIN — MORPHINE SULFATE 6 MILLIGRAM(S): 50 CAPSULE, EXTENDED RELEASE ORAL at 01:32

## 2023-11-19 RX ADMIN — Medication 60 MILLIGRAM(S): at 01:48

## 2023-11-19 RX ADMIN — APIXABAN 5 MILLIGRAM(S): 2.5 TABLET, FILM COATED ORAL at 17:38

## 2023-11-19 RX ADMIN — Medication 30 MILLIGRAM(S): at 17:09

## 2023-11-19 RX ADMIN — Medication 30 MILLIGRAM(S): at 17:39

## 2023-11-19 RX ADMIN — Medication 10 MILLIGRAM(S): at 16:44

## 2023-11-19 RX ADMIN — SODIUM CHLORIDE 1000 MILLILITER(S): 9 INJECTION INTRAMUSCULAR; INTRAVENOUS; SUBCUTANEOUS at 16:13

## 2023-11-19 RX ADMIN — Medication 20 MILLIGRAM(S): at 01:49

## 2023-11-19 NOTE — ED PROVIDER NOTE - PHYSICAL EXAMINATION
Constitutional:  Well appearing, awake, alert, oriented  and in no apparent distress.  HEENT: Airway patent, Nasal mucosa clear. Mouth with normal mucosa.   Eyes: Clear bilaterally, pupils equal, round and reactive to light.  Cardiac: irregularly irregular, tachycardia  Respiratory: Breath sounds clear and equal bilaterally.  GI: Abdomen soft, non-tender, no guarding.  MSK: Spine appears normal, range of motion is not limited, no muscle or joint tenderness  Neuro: Alert and oriented, no focal deficits, no motor or sensory deficits.  Skin: Skin normal color for race, warm, dry and intact. No evidence of rash.

## 2023-11-19 NOTE — PATIENT PROFILE ADULT - INTERNATIONAL TRAVEL COUNTRIES
Subjective   Patient ID: Gilbert is a 9 year old male who is accompanied by:mother     Chief Complaint   Patient presents with   • Office Visit     In person office visit.   • Fever   • Cough   • Throat Problem       Mom reports Gilbert and his brother first developed a cough about 3 days ago  Yesterday AM, woke up with a sore throat and subjective fever  Describes sore throat as pain w/ swallowing  Slight headache  Decreased appetite for the last few days    Review of Systems   Constitutional: Positive for appetite change and fever.   HENT: Positive for sore throat. Negative for congestion and rhinorrhea.    Gastrointestinal: Negative for abdominal pain, diarrhea and vomiting.   Neurological: Positive for headaches.   All other systems reviewed and are negative.      Patient's medications, allergies, past medical, surgical, social and family histories were reviewed and updated as appropriate.    Objective   Vitals:/68 (BP Location: LUE - Left upper extremity, Patient Position: Sitting, Cuff Size: Small Adult)   Pulse 97   Temp 99.2 °F (37.3 °C) (Temporal)   Resp 20   Ht 5' 5.75\" (1.67 m)   Wt 58.5 kg (128 lb 15.5 oz)   BMI 20.98 kg/m²   BSA 1.66 m²   Physical Exam  Vitals and nursing note reviewed. Exam conducted with a chaperone present.   Constitutional:       General: He is active. He is not in acute distress.     Appearance: Normal appearance. He is well-developed and normal weight.   HENT:      Head: Normocephalic and atraumatic.      Right Ear: Tympanic membrane, ear canal and external ear normal.      Left Ear: Tympanic membrane, ear canal and external ear normal.      Nose: No congestion or rhinorrhea.      Mouth/Throat:      Mouth: Mucous membranes are moist.      Pharynx: Oropharynx is clear. Posterior oropharyngeal erythema present. No oropharyngeal exudate.      Neck: Normal range of motion and neck supple.   Eyes:      General:         Right eye: No discharge.         Left eye: No discharge.       Extraocular Movements: Extraocular movements intact.      Conjunctiva/sclera: Conjunctivae normal.      Pupils: Pupils are equal, round, and reactive to light.   Cardiovascular:      Rate and Rhythm: Normal rate and regular rhythm.      Pulses: Normal pulses.      Heart sounds: Normal heart sounds, S1 normal and S2 normal. No murmur heard.  Pulmonary:      Effort: Pulmonary effort is normal.      Breath sounds: Normal breath sounds and air entry.   Abdominal:      General: Bowel sounds are normal.      Palpations: Abdomen is soft.   Musculoskeletal:         General: Normal range of motion.   Lymphadenopathy:      Cervical: Cervical adenopathy present.   Skin:     General: Skin is warm and moist.      Capillary Refill: Capillary refill takes less than 2 seconds.   Neurological:      Mental Status: He is alert.   Psychiatric:         Mood and Affect: Mood normal.         Assessment   Problem List Items Addressed This Visit        ENT    Acute pharyngitis - Primary     Reassuring that Gilbert currently well appearing though has sx concerning for strep  COVID and strep test done today  Instructed to wear mask around others until results in; will call Mom once results available.  Supportive care and hand hygiene discussed.   Instructed to go to the ER if worsening sx.         Relevant Orders    2019 Novel Coronavirus (SARS-CoV-2)    STREPTOCOCCUS GROUP A (STREPTOCOCCUS PYOGENES), BACTERIAL CULTURE     I spent a total of 31 minutes on the day of the visit.  This includes pre-charting, chart review and documenting.      Instructed to call if problem worsens or does not improve within the next 24 hours otherwise follow-up prn   Wyandotte McIntosh Draper

## 2023-11-19 NOTE — H&P ADULT - PROBLEM SELECTOR PLAN 1
Hx of pAfib, s/p ablation 2014. Presented to [facility] in afib. Upon arrival to [facility], self converted to NSR  - S/p NS 1L bolus, Cardizem 30mg POx2, Cardizem 10mg IVPx1, Eliquis 5mg POx1  - Cardioversion attempted twice @ [facility] - first attempt was made at 100J patient converted to NSR however after patient started to cry HR once again elevated and went back in Afib. Second attempt was made at 120J and and was again not successful converting to NSR however HR improved.  - EP consult in AM for ?ablation  - Continue to monitor on tele Hx of pAfib, s/p ablation 2014. Presented to [facility] in afib. Upon arrival to [facility], self converted to NSR  - S/p NS 1L bolus, Cardizem 30mg POx2, Cardizem 10mg IVPx1, Eliquis 5mg POx1  - Cardioversion attempted twice @ [facility] - first attempt was made at 100J patient converted to NSR however after patient started to cry HR once again elevated and went back in Afib. Second attempt was made at 120J and and was again not successful converting to NSR however HR improved.  - EP consult in AM for ?ablation  - Continue to monitor on tele Hx of pAfib, s/p ablation 2014. Presented to [facility] in afib. Upon arrival to [facility], self converted to NSR  - S/p NS 1L bolus, Cardizem 30mg POx2, Cardizem 10mg IVPx1, Eliquis 5mg POx1  - Cardioversion attempted twice @ [facility] - first attempt was made at 100J patient converted to NSR however after patient started to cry HR once again elevated and went back in Afib. Second attempt was made at 120J and and was again not successful converting to NSR however HR improved.  - EP consult in AM for ?ablation  - Continue to monitor on tele Hx of pAfib, s/p ablation 2014 (no longer on AC or BB). Presented to [facility] in afib (see interventions below). Upon arrival to [facility], self converted to NSR. HD stable.  - S/p NS 1L bolus, Cardizem 30mg POx2, Cardizem 10mg IVPx1, Eliquis 5mg POx1  - Cardioversion attempted twice @ [facility] - first attempt was made at 100J patient converted to NSR however after patient started to cry HR once again elevated and went back in Afib. Second attempt was made at 120J and was again not successful converting to NSR however HR improved.  - EP consult in AM for ?ablation  - Continue to monitor on tele  - NPO after NM for possible intervention w EP Hx of pAfib, s/p ablation 2014 (no longer on AC or BB). Presented to [facility] in afib (see interventions below). Upon arrival to [facility], self converted to NSR. HD stable.  - S/p NS 1L bolus, Cardizem 30mg POx2, Cardizem 10mg IVPx1, Eliquis 5mg POx1  - Cardioversion attempted twice @ [facility] - first attempt was made at 100J patient converted to NSR however after patient started to cry HR once again elevated and went back in Afib. Second attempt was made at 120J and was again not successful converting to NSR however HR improved.  - EP consult in AM for ?ablation  - Continue to monitor on tele  - NPO after NM for possible intervention w EP Hx of pAfib, s/p ablation 2014 (no longer on AC or BB). Presented to [facility] in afib (see interventions below). Upon arrival to [facility], self converted to NSR. HD stable.  - S/p NS 1L bolus, Cardizem 30mg POx2, Cardizem 10mg IVPx1, Eliquis 5mg POx1  - Cardioversion attempted twice @ [facility] - first attempt was made at 100J patient converted to NSR however after patient started to cry HR once again elevated and went back in Afib. Second attempt was made at 120J and was again not successful converting to NSR however HR improved.  - EP consult in AM for ?ablation  - Continue to monitor on tele  - NPO after NM for possible intervention w EP Hx of pAfib, s/p ablation 2014 (no longer on AC or BB). Presented to [facility] in afib (see interventions below). Upon arrival to [facility], self converted to NSR. HD stable.  - S/p NS 1L bolus, Cardizem 30mg POx2, Cardizem 10mg IVPx1, Eliquis 5mg POx1  - Cardioversion attempted twice @ [facility] - first attempt was made at 100J patient converted to NSR however after patient started to cry HR once again elevated and went back in Afib. Second attempt was made at 120J and was again not successful converting to NSR however HR improved.  - [id number] of 1  - EP consult in AM for ?ablation  - Continue to monitor on tele  - NPO after NM for possible intervention w EP Hx of pAfib, s/p ablation 2014 (no longer on AC or BB). Presented to [facility] in afib (see interventions below). Upon arrival to [facility], self converted to NSR. HD stable.  - S/p NS 1L bolus, Cardizem 30mg POx2, Cardizem 10mg IVPx1, Eliquis 5mg POx1  - Cardioversion attempted twice @ [facility] - first attempt was made at 100J patient converted to NSR however after patient started to cry HR once again elevated and went back in Afib. Second attempt was made at 120J and was again not successful converting to NSR however HR improved.  - [id number] of 1  - EP consult in AM for ?ablation  - Continue to monitor on tele  - NPO after NM for possible intervention w EP Hx of pAfib, s/p ablation 2014 (no longer on AC or BB). Presented to [facility] in afib (see interventions below). Upon arrival to [facility], self converted to NSR. HD stable.  - S/p NS 1L bolus, Cardizem 30mg POx2, Cardizem 10mg IVPx1, Eliquis 5mg POx1  - Cardioversion attempted twice @ [facility] - first attempt was made at 100J patient converted to NSR however after patient started to cry HR once again elevated and went back in Afib. Second attempt was made at 120J and was again not successful converting to NSR however HR improved.  - [id number] of 1  - EP consult in AM for ?ablation  - Continue to monitor on tele  - NPO after NM for possible intervention w EP

## 2023-11-19 NOTE — ED PROVIDER NOTE - PATIENT PORTAL LINK FT
You can access the FollowMyHealth Patient Portal offered by [facility] by registering at the following website: [email or web address]. By joining [employer]’s FollowMyHealth portal, you will also be able to view your health information using other applications (apps) compatible with our system. You can access the FollowMyHealth Patient Portal offered by [facility] by registering at the following website: [email or web address]. By joining [employer]’s FollowMyHealth portal, you will also be able to view your health information using other applications (apps) compatible with our system. You can access the FollowMyHealth Patient Portal offered by [facility] by registering at the following website: [email or web address]. By joining [employer]’s FollowMyHealth portal, you will also be able to view your health information using other applications (apps) compatible with our system.

## 2023-11-19 NOTE — ED PROVIDER NOTE - CLINICAL SUMMARY MEDICAL DECISION MAKING FREE TEXT BOX
A/P: 38-year-old female with a history of atrial fibrillation presenting to the emergency with palpitations  -- Differential includes but limited to potentially life-threatening diagnosis such as atrial fibrillation with RVR, SVT, sinus tachycardia, anxiety  -- EKG shows that patient is in atrial fibrillation currently  -- Given that patient has chest pain, hypertension, and symptoms are given 24-hour onset, will attempt cardioversion

## 2023-11-19 NOTE — PATIENT PROFILE ADULT - FALL HARM RISK - FALL HARM RISK
Detail Level: Detailed
Add 67966 Cpt? (Important Note: In 2017 The Use Of 64070 Is Being Tracked By Cms To Determine Future Global Period Reimbursement For Global Periods): yes
Other

## 2023-11-19 NOTE — ED ADULT NURSE NOTE - OBJECTIVE STATEMENT
Patient is a 37 y/o F c/o palpitations. patient reports she was seen in ER last night for palpitations and chest pain. patient reports she was cardioverted here last night. Patient reports after being discharged patient syncopized and loss consciousness around 12 pm today. patient placed on continuous cardiac monitor and pulse ox. patient denies head strike. Patient is a 39 y/o F c/o palpitations. patient reports she was seen in ER last night for palpitations and chest pain. patient reports she was cardioverted here last night. Patient reports after being discharged patient syncopized and loss consciousness around 12 pm today. patient placed on continuous cardiac monitor and pulse ox. patient denies head strike.

## 2023-11-19 NOTE — ED PROVIDER NOTE - OBJECTIVE STATEMENT
37 y/o F with PMH of A-fib s/p ablation 10 years ago [previously on Metoprolol and blood thinners but no longer on medications] presents to the ED for evaluation. Pt was seen at this ED last night for palpitations and she was found to be in A-fib. Pt was subsequently cardioverted while in the ED but Pt went back into A-fib from [facility]. Pt was instructed to follow up outpatient for further evaluation. Today, Pt states she was feeling anxious and near syncopal. While seated on the toilet, Pt had a syncopal episode.  was nearby and Pt did not have head strike. Pt endorses SOB but denies active CP. No fevers, chills, or vomiting. 39 y/o F with PMH of A-fib s/p ablation 10 years ago [previously on Metoprolol and blood thinners but no longer on medications] presents to the ED for evaluation. Pt was seen at this ED last night for palpitations and she was found to be in A-fib. Pt was subsequently cardioverted while in the ED but Pt went back into A-fib from [facility]. Pt was instructed to follow up outpatient for further evaluation. Today, Pt states she was feeling anxious and near syncopal. While seated on the toilet, Pt had a syncopal episode.  was nearby and Pt did not have head strike. Pt endorses SOB but denies active CP. No fevers, chills, or vomiting. 39 y/o F with PMH of A-fib s/p ablation 10 years ago [previously on Metoprolol and blood thinners but no longer on medications] presents to the ED for evaluation. Pt was seen at this ED last night for palpitations and she was found to be in A-fib. Pt was subsequently cardioverted while in the ED but Pt went back into A-fib from [facility]. Pt was instructed to follow up outpatient for further evaluation. Today, Pt states she was feeling anxious and near syncopal. While seated on the toilet, Pt had a syncopal episode.  was nearby and Pt did not have head strike. Pt endorses SOB but denies active CP. No fevers, chills, or vomiting.

## 2023-11-19 NOTE — ED PROVIDER NOTE - CARE PROVIDERS DIRECT ADDRESSES
[email or web address]

## 2023-11-19 NOTE — ED PROVIDER NOTE - CARE PROVIDER_API CALL
Lewis Sarabia  Cardiovascular Disease  [address] [place] [address], [address]  [place] [address]  Phone: [phone]  Fax: [phone]  Follow Up Time:    Lewis Sarabia  Cardiovascular Disease  [address] [place] [address], [address]  [place] [address]  Phone: [phone]  Fax: [phone]  Follow Up Time:    Lewis Sarabia  Cardiovascular Disease  [address] [place] [address], [address]  [place] [address]  Phone: [phone]  Fax: [phone]  Follow Up Time:

## 2023-11-19 NOTE — ED PROVIDER NOTE - PROGRESS NOTE DETAILS
Attempted cardioversion twice, first attempt was made at 100 J, patient did convert to normal sinus rhythm afterwards but patient started to cry, driving heart rate up and then patient went back into atrial fibrillation.  Second attempt was at 120 J, was not successful converting to sinus rhythm.  P.o. and IV Cardizem ordered.  Case is discussed with on-call cardiologist, Dr. Mairno, who is in agreement with plan. Attempted cardioversion twice, first attempt was made at 100 J, patient did convert to normal sinus rhythm afterwards but patient started to cry, driving heart rate up and then patient went back into atrial fibrillation.  Second attempt was at 120 J, was not successful converting to sinus rhythm.  P.o. and IV Cardizem ordered.  Case is discussed with on-call cardiologist, Dr. Marino, who is in agreement with plan. Patient's heart rate is improved at this time.  Patient wishes to follow-up as an outpatient.  Dr. Marino is in agreement with plan, patient to follow-up in his office on Monday 1 PM.

## 2023-11-19 NOTE — ED ADULT TRIAGE NOTE - CHIEF COMPLAINT QUOTE
BIBEMS from home. Pt cardioverted here last night. Pt states palpitations on since this am. Given 17mg ivp Cardizem prior to arrival.

## 2023-11-19 NOTE — ED PROVIDER NOTE - CLINICAL SUMMARY MEDICAL DECISION MAKING FREE TEXT BOX
37 y/o F with Hx of A-fib s/p unsuccessful cardioversion early this morning presenting with persistent palpitations and now lightheadedness and episode of syncope w/o head strike. On exam, Pt is afebrile. BP is within normal limits. On exam, Pt has irregularly irregular rhythm but HR is within normal limits. EKG shows A-fib. Suspect possible A-fib with RVR at home. Differential Dx includes ACS or other arrythmia. Plan for labs, Chest XR, and IV hydration. Will reassess clinically after results have been obtained.

## 2023-11-19 NOTE — ED PROVIDER NOTE - OBJECTIVE STATEMENT
38-year-old female with past medical history of atrial fibrillation, status post ablation 10 years ago presented to the emergency room complaining of palpitations.  Patient states that she started to feel palpitations after [place] stroller [place], states that she felt winded, palpitations, chest pain afterwards.  States that she checked her heart rate and noticed it to be irregular and fast, similar to how she felt with atrial fibrillation in the past.  Currently not on any medication.  Denies smoking, drinking, drugs.

## 2023-11-19 NOTE — H&P ADULT - ASSESSMENT
38F PMHx of AF s/p ablation (2014 - no longer on AC or metoprolol) who initially presented to [facility] ED on 11/18/23 PM with complaints of palpitations that began after lifting stroller w/ child [place]. At [facility], pt underwent unsuccessful cardioverted x2, given Cardizem PO & IV and Eliquis with improvement in rates. Pt was discharged home w outpt follow up however returned to [facility] after syncopal episode. Pt now transferred to [facility] for futher afib management. Upon arrival to [facility], pt self converted to NSR.  38F PMHx of AF s/p ablation (2014 - no longer on AC or metoprolol) who initially presented to [facility] ED on 11/18/23 PM with complaints of palpitations that began after lifting stroller w/ child [place]. At [facility], pt underwent unsuccessful cardioverted x2, given Cardizem PO & IV and Eliquis with improvement in rates. Pt was discharged home w outpt follow up however returned to [facility] after syncopal episode. Pt now transferred to [facility] for futher afib management. Upon arrival to [facility], pt self converted to NSR.  38F PMHx of AF s/p ablation (2014 - no longer on AC or metoprolol) who initially presented to [facility] ED on 11/18/23 PM with complaints of palpitations that began after lifting stroller w/ [place]. At [facility], pt underwent unsuccessful cardioverted x2, given Cardizem PO & IV and Eliquis with improvement in rates. Pt was discharged home w outpt follow up however returned to [facility] after syncopal episode. Pt now transferred to [facility] for futher afib management. Upon arrival to [facility], pt self converted to NSR.

## 2023-11-19 NOTE — ED ADULT NURSE NOTE - OBJECTIVE STATEMENT
pt presents to ed reporting chest pain and palpitations that started at 10 pm last night. pmh of afib with ablation in 2017. afib on ekg  immediately placed on CM

## 2023-11-19 NOTE — H&P ADULT - HISTORY OF PRESENT ILLNESS
38F PMHx of CAD, AF s/p ablation who presented to [facility] ED on 11/19/23 with complaints of palpitations that began after lifting stroller w/ child while climbing [place]. Patient endorse associated GIL and CP.  States that she checked her heart rate and noticed it to be irregular and fast, similar to how she felt with atrial fibrillation in the past. At this time denies CP, SOB, palpitations. orthopnea/PND, abd pain, N/V/D,  LE swelling, hematuria, melena, hematochezia, fever, chills, recent travel or sick contacts.     ER Course  Labs: WBC 8.61 H/H 13,3/40.7 Plt 266 Na 139 K 3.6 Cl 104 BUN/Cr 19/0.65 Mg 2.0 ALK 97 AST 16 ALT 21  Trop I 5.2 < 4.0 TSH 1.668   EKG afib 91 bpm  ED Intervnetions: NS 1L bolus, Cardizem 30mg POx2, Cardizem 10mg IVPx1, Eliquis 5mg POx1  Cardioversion attempted twice- first attempt was made at 100J patient convernted to NSR however after patient started to cry. HR once again elevated and went back in Afib. Second attempt was made at 120J and and was not successful converting to NSR.       Patient now transferred to [facility] Cardiac Tele for further management of Afib.  38F PMHx of CAD, AF s/p ablation who presented to [facility] ED on 11/19/23 with complaints of palpitations that began after lifting stroller w/ child [place]. Patient endorse associated GIL and CP.  States that she checked her heart rate and noticed it to be irregular and fast, similar to how she felt with atrial fibrillation in the past. At this time denies CP, SOB, palpitations. orthopnea/PND, abd pain, N/V/D,  LE swelling, hematuria, melena, hematochezia, fever, chills, recent travel or sick contacts.     ER Course  Labs: WBC 8.61 H/H 13,3/40.7 Plt 266 Na 139 K 3.6 Cl 104 BUN/Cr 19/0.65 Mg 2.0 ALK 97 AST 16 ALT 21  Trop I 5.2 < 4.0 TSH 1.668   EKG afib 91 bpm  ED Intervnetions: NS 1L bolus, Cardizem 30mg POx2, Cardizem 10mg IVPx1, Eliquis 5mg POx1  Cardioversion attempted twice- first attempt was made at 100J patient convernted to NSR however after patient started to cry. HR once again elevated and went back in Afib. Second attempt was made at 120J and and was not successful converting to NSR.       Patient now transferred to [facility] Cardiac Tele for further management of Afib.  38F PMHx of CAD, AF s/p ablation who presented to [facility] ED on 11/19/23 with complaints of palpitations that began after lifting stroller w/ child while [place]. Patient endorse associated GIL and CP.  States that she checked her heart rate and noticed it to be irregular and fast, similar to how she felt with atrial fibrillation in the past. At this time denies CP, SOB, palpitations. orthopnea/PND, abd pain, N/V/D,  LE swelling, hematuria, melena, hematochezia, fever, chills, recent travel or sick contacts.     ER Course  Labs: WBC 8.61 H/H 13,3/40.7 Plt 266 Na 139 K 3.6 Cl 104 BUN/Cr 19/0.65 Mg 2.0 ALK 97 AST 16 ALT 21  Trop I 5.2 < 4.0 TSH 1.668   EKG afib 91 bpm  ED Intervnetions: NS 1L bolus, Cardizem 30mg POx2, Cardizem 10mg IVPx1, Eliquis 5mg POx1  Cardioversion attempted twice- first attempt was made at 100J patient convernted to NSR however after patient started to cry. HR once again elevated and went back in Afib. Second attempt was made at 120J and and was not successful converting to NSR.       Patient now transferred to [facility] Cardiac Tele for further management of Afib.  38F PMHx of AF s/p ablation who initially presented to [facility] ED on 11/18/23 PM with complaints of palpitations that began after lifting stroller w/ child while climbing [place]. Patient endorse associated GIL and CP.  States that she checked her heart rate and noticed it to be irregular and fast, similar to how she felt with atrial fibrillation in the past. Pt was At this time denies CP, SOB, palpitations. orthopnea/PND, abd pain, N/V/D,  LE swelling, hematuria, melena, hematochezia, fever, chills, recent travel or sick contacts.     ER Course  Labs: WBC 8.61 H/H 13,3/40.7 Plt 266 Na 139 K 3.6 Cl 104 BUN/Cr 19/0.65 Mg 2.0 ALK 97 AST 16 ALT 21  Trop I 5.2 < 4.0 TSH 1.668   Initial EKG afib 91 bpm  ED Interventions: NS 1L bolus, Cardizem 30mg POx2, Cardizem 10mg IVPx1, Eliquis 5mg POx1  Cardioversion attempted twice- first attempt was made at 100J patient converted to NSR however after patient started to cry. HR once again elevated and went back in Afib. Second attempt was made at 120J and and was not successful converting to NSR.       Patient now transferred to [facility] Cardiac Tele for further management of Afib.  38F PMHx of AF s/p ablation who initially presented to [facility] ED on 11/18/23 PM with complaints of palpitations that began after lifting stroller w/ child while [place]. Patient endorse associated GIL and CP.  States that she checked her heart rate and noticed it to be irregular and fast, similar to how she felt with atrial fibrillation in the past. Pt was At this time denies CP, SOB, palpitations. orthopnea/PND, abd pain, N/V/D,  LE swelling, hematuria, melena, hematochezia, fever, chills, recent travel or sick contacts.     ER Course  Labs: WBC 8.61 H/H 13,3/40.7 Plt 266 Na 139 K 3.6 Cl 104 BUN/Cr 19/0.65 Mg 2.0 ALK 97 AST 16 ALT 21  Trop I 5.2 < 4.0 TSH 1.668   Initial EKG afib 91 bpm  ED Interventions: NS 1L bolus, Cardizem 30mg POx2, Cardizem 10mg IVPx1, Eliquis 5mg POx1  Cardioversion attempted twice- first attempt was made at 100J patient converted to NSR however after patient started to cry. HR once again elevated and went back in Afib. Second attempt was made at 120J and and was not successful converting to NSR.       Patient now transferred to [facility] Cardiac Tele for further management of Afib.  38F PMHx of AF s/p ablation who initially presented to [facility] ED on 11/18/23 PM with complaints of palpitations that began after lifting stroller w/ child while [place]. Patient endorse associated GIL and CP.  States that she checked her heart rate and noticed it to be irregular and fast, similar to how she felt with atrial fibrillation in the past. Pt was At this time denies CP, SOB, palpitations. orthopnea/PND, abd pain, N/V/D,  LE swelling, hematuria, melena, hematochezia, fever, chills, recent travel or sick contacts.     ER Course  Labs: WBC 8.61 H/H 13,3/40.7 Plt 266 Na 139 K 3.6 Cl 104 BUN/Cr 19/0.65 Mg 2.0 ALK 97 AST 16 ALT 21  Trop I 5.2 < 4.0 TSH 1.668   Initial EKG afib 91 bpm  ED Interventions: NS 1L bolus, Cardizem 30mg POx2, Cardizem 10mg IVPx1, Eliquis 5mg POx1  Cardioversion attempted twice- first attempt was made at 100J patient converted to NSR however after patient started to cry. HR once again elevated and went back in Afib. Second attempt was made at 120J and and was not successful converting to NSR.       Patient now transferred to [facility] Cardiac Tele for further management of Afib.  38F PMHx of AF s/p ablation who initially presented to [facility] ED on 11/18/23 PM with complaints of palpitations that began after lifting stroller w/ child while climbing [place]. Patient endorse associated GIL and CP.  States that she checked her heart rate and noticed it to be irregular and fast, similar to how she felt with atrial fibrillation in the past. Pt was cardiovertedx2 at [facility] (see below) with unsuccessful cardioversion but improved rates and was planned to follow up outpt with Dr. Andino. Upon returning home, pt was sitting on the toilet and began to feel "funny" and experienced a syncopal episode witnessed by her  No head strike. She returned to [facility] and was still noted to be in afib.  At this time denies CP, SOB, palpitations. orthopnea/PND, abd pain, N/V/D,  LE swelling, hematuria, melena, hematochezia, fever, chills, recent travel or sick contacts.     ER Course  Labs: WBC 8.61 H/H 13,3/40.7 Plt 266 Na 139 K 3.6 Cl 104 BUN/Cr 19/0.65 Mg 2.0 ALK 97 AST 16 ALT 21  Trop I 5.2 < 4.0 TSH 1.668   Initial EKG afib 91 bpm  ED Interventions: NS 1L bolus, Cardizem 30mg POx2, Cardizem 10mg IVPx1, Eliquis 5mg POx1  Cardioversion attempted twice- first attempt was made at 100J patient converted to NSR however after patient started to cry HR once again elevated and went back in Afib. Second attempt was made at 120J and and was again not successful converting to NSR however HR improved.         Patient now transferred to [facility] Cardiac Tele for further management of Afib. Upon arrival to [facility] pt now in NSR. Denies any acute complaints at this time. 38F PMHx of AF s/p ablation who initially presented to [facility] ED on 11/18/23 PM with complaints of palpitations that began after lifting stroller w/ child while [place]. Patient endorse associated GIL and CP.  States that she checked her heart rate and noticed it to be irregular and fast, similar to how she felt with atrial fibrillation in the past. Pt was cardiovertedx2 at [facility] (see below) with unsuccessful cardioversion but improved rates and was planned to follow up outpt with Dr. Andino. Upon returning home, pt was sitting on the toilet and began to feel "funny" and experienced a syncopal episode witnessed by her  No head strike. She returned to [facility] and was still noted to be in afib.  At this time denies CP, SOB, palpitations. orthopnea/PND, abd pain, N/V/D,  LE swelling, hematuria, melena, hematochezia, fever, chills, recent travel or sick contacts.     ER Course  Labs: WBC 8.61 H/H 13,3/40.7 Plt 266 Na 139 K 3.6 Cl 104 BUN/Cr 19/0.65 Mg 2.0 ALK 97 AST 16 ALT 21  Trop I 5.2 < 4.0 TSH 1.668   Initial EKG afib 91 bpm  ED Interventions: NS 1L bolus, Cardizem 30mg POx2, Cardizem 10mg IVPx1, Eliquis 5mg POx1  Cardioversion attempted twice- first attempt was made at 100J patient converted to NSR however after patient started to cry HR once again elevated and went back in Afib. Second attempt was made at 120J and and was again not successful converting to NSR however HR improved.         Patient now transferred to [facility] Cardiac Tele for further management of Afib. Upon arrival to [facility] pt now in NSR. Denies any acute complaints at this time. 38F PMHx of AF s/p ablation who initially presented to [facility] ED on 11/18/23 PM with complaints of palpitations that began after lifting stroller w/ child while [place]. Patient endorse associated GIL and CP.  States that she checked her heart rate and noticed it to be irregular and fast, similar to how she felt with atrial fibrillation in the past. Pt was cardiovertedx2 at [facility] (see below) with unsuccessful cardioversion but improved rates and was planned to follow up outpt with Dr. Andino. Upon returning home, pt was sitting on the toilet and began to feel "funny" and experienced a syncopal episode witnessed by her  No head strike. She returned to [facility] and was still noted to be in afib.  At this time denies CP, SOB, palpitations. orthopnea/PND, abd pain, N/V/D,  LE swelling, hematuria, melena, hematochezia, fever, chills, recent travel or sick contacts.     ER Course  Labs: WBC 8.61 H/H 13,3/40.7 Plt 266 Na 139 K 3.6 Cl 104 BUN/Cr 19/0.65 Mg 2.0 ALK 97 AST 16 ALT 21  Trop I 5.2 < 4.0 TSH 1.668   Initial EKG afib 91 bpm  ED Interventions: NS 1L bolus, Cardizem 30mg POx2, Cardizem 10mg IVPx1, Eliquis 5mg POx1  Cardioversion attempted twice- first attempt was made at 100J patient converted to NSR however after patient started to cry HR once again elevated and went back in Afib. Second attempt was made at 120J and and was again not successful converting to NSR however HR improved.         Patient now transferred to [facility] Cardiac Tele for further management of Afib. Upon arrival to [facility] pt now in NSR. Denies any acute complaints at this time. 38F PMHx of AF s/p ablation (2014 - no longer on AC or metoprolol) who initially presented to [facility] ED on 11/18/23 PM with complaints of palpitations that began after lifting stroller w/ child while [place]. Patient endorse associated GIL and CP.  States that she checked her heart rate and noticed it to be irregular and fast, similar to how she felt with atrial fibrillation in the past. Pt was cardiovertedx2 at [facility] (see below) with unsuccessful cardioversion but improved rates and was planned to follow up outpt with Dr. Rmaos. Upon returning home, pt was sitting on the toilet and began to feel "funny" and experienced a syncopal episode witnessed by her  No head strike. She returned to [facility] and was still noted to be in afib.  At this time denies CP, SOB, palpitations. orthopnea/PND, abd pain, N/V/D,  LE swelling, hematuria, melena, hematochezia, fever, chills, recent travel or sick contacts.     ER Course  Labs: WBC 8.61 H/H 13,3/40.7 Plt 266 Na 139 K 3.6 Cl 104 BUN/Cr 19/0.65 Mg 2.0 ALK 97 AST 16 ALT 21  Trop I 5.2 < 4.0 TSH 1.668   Initial EKG afib 91 bpm  ED Interventions: NS 1L bolus, Cardizem 30mg POx2, Cardizem 10mg IVPx1, Eliquis 5mg POx1  Cardioversion attempted twice- first attempt was made at 100J patient converted to NSR however after patient started to cry HR once again elevated and went back in Afib. Second attempt was made at 120J and and was again not successful converting to NSR however HR improved.         Patient now transferred to [facility] Cardiac Tele for further management of Afib. Upon arrival to [facility] pt now in NSR. Denies any acute complaints at this time. 38F PMHx of AF s/p ablation (2014 - no longer on AC or metoprolol) who initially presented to [facility] ED on 11/18/23 PM with complaints of palpitations that began after lifting stroller w/ child while [place]. Patient endorse associated GIL and CP.  States that she checked her heart rate and noticed it to be irregular and fast, similar to how she felt with atrial fibrillation in the past. Pt was cardiovertedx2 at [facility] (see below) with unsuccessful cardioversion but improved rates and was planned to follow up outpt with Dr. Ramos. Upon returning home, pt was sitting on the toilet and began to feel "funny" and experienced a syncopal episode witnessed by her  No head strike. She returned to [facility] and was still noted to be in afib.  At this time denies CP, SOB, palpitations. orthopnea/PND, abd pain, N/V/D,  LE swelling, hematuria, melena, hematochezia, fever, chills, recent travel or sick contacts.     ER Course  Labs: WBC 8.61 H/H 13,3/40.7 Plt 266 Na 139 K 3.6 Cl 104 BUN/Cr 19/0.65 Mg 2.0 ALK 97 AST 16 ALT 21  Trop I 5.2 < 4.0 TSH 1.668   Initial EKG afib 91 bpm  ED Interventions: NS 1L bolus, Cardizem 30mg POx2, Cardizem 10mg IVPx1, Eliquis 5mg POx1  Cardioversion attempted twice- first attempt was made at 100J patient converted to NSR however after patient started to cry HR once again elevated and went back in Afib. Second attempt was made at 120J and and was again not successful converting to NSR however HR improved.         Patient now transferred to [facility] Cardiac Tele for further management of Afib. Upon arrival to [facility] pt now in NSR. Denies any acute complaints at this time. 38F PMHx of AF s/p ablation (2014 - no longer on AC or metoprolol) who initially presented to [facility] ED on 11/18/23 PM with complaints of palpitations that began after lifting stroller w/ child [place]. Patient endorse associated GIL and CP.  States that she checked her heart rate and noticed it to be irregular and fast, similar to how she felt with atrial fibrillation in the past. Pt was cardiovertedx2 at [facility] (see below) with unsuccessful cardioversion but improved rates and was planned to follow up outpt with Dr. Ramos. Upon returning home, pt was sitting on the toilet and began to feel "funny" and experienced a syncopal episode witnessed by her  No head strike. She returned to [facility] and was still noted to be in afib.  At this time denies CP, SOB, palpitations. orthopnea/PND, abd pain, N/V/D,  LE swelling, hematuria, melena, hematochezia, fever, chills, recent travel or sick contacts.     ER Course  Labs: WBC 8.61 H/H 13,3/40.7 Plt 266 Na 139 K 3.6 Cl 104 BUN/Cr 19/0.65 Mg 2.0 ALK 97 AST 16 ALT 21  Trop I 5.2 < 4.0 TSH 1.668   Initial EKG afib 91 bpm  ED Interventions: NS 1L bolus, Cardizem 30mg POx2, Cardizem 10mg IVPx1, Eliquis 5mg POx1  Cardioversion attempted twice- first attempt was made at 100J patient converted to NSR however after patient started to cry HR once again elevated and went back in Afib. Second attempt was made at 120J and and was again not successful converting to NSR however HR improved.         Patient now transferred to [facility] Cardiac Tele for further management of Afib. Upon arrival to [facility] pt now in NSR. Denies any acute complaints at this time.

## 2023-11-19 NOTE — ED ADULT NURSE NOTE - NSFALLUNIVINTERV_ED_ALL_ED
Bed/Stretcher in lowest position, wheels locked, appropriate side rails in place/Call bell, personal items and telephone in reach/Instruct patient to call for assistance before getting out of bed/chair/stretcher/Non-slip footwear applied when patient is off stretcher/[place] to call system/Physically safe environment - no spills, clutter or unnecessary equipment/Purposeful proactive rounding/Room/bathroom lighting operational, light cord in reach Statement Selected Bed/Stretcher in lowest position, wheels locked, appropriate side rails in place/Call bell, personal items and telephone in reach/Instruct patient to call for assistance before getting out of bed/chair/stretcher/Non-slip footwear applied when patient is off stretcher/[place] to call system/Physically safe environment - no spills, clutter or unnecessary equipment/Purposeful proactive rounding/Room/bathroom lighting operational, light cord in reach Bed/Stretcher in lowest position, wheels locked, appropriate side rails in place/Call bell, personal items and telephone in reach/Instruct patient to call for assistance before getting out of bed/chair/stretcher/Non-slip footwear applied when patient is off stretcher/[place] to call system/Physically safe environment - no spills, clutter or unnecessary equipment/Purposeful proactive rounding/Room/bathroom lighting operational, light cord in reach

## 2023-11-19 NOTE — ED PROVIDER NOTE - CRITICAL CARE ATTENDING CONTRIBUTION TO CARE
The patient was seen immediately upon arrival due to a high probability of imminent or life-threatening deterioration secondary to atrial fibrillation with RVR, which required my direct attention, intervention, and personal management at the bedside. I have personally provided critical care time exclusive of time spent on separately billable procedures. Time includes review of laboratory data, radiology results, discussion with consultants, discussion with the patient's family, and monitoring for potential decompensation.

## 2023-11-19 NOTE — ED ADULT TRIAGE NOTE - CHIEF COMPLAINT QUOTE
pt. reports sudden onset of palpitations around 11pm this [place], palpitations has persisted accompanied by chest pain. Pt. reports having an ablation about 10 years ago when she was originally diagnosed with A-fib. Currently presents in afib with EMS.

## 2023-11-19 NOTE — PATIENT PROFILE ADULT - FALL HARM RISK - HARM RISK INTERVENTIONS
Communicate Risk of Fall with Harm to all staff/Reinforce activity limits and safety measures with patient and family/Tailored Fall Risk Interventions/Visual Cue: Yellow wristband and red socks/Bed in lowest position, wheels locked, appropriate side rails in place/Call bell, personal items and telephone in reach/Instruct patient to call for assistance before getting out of bed or chair/Non-slip footwear when patient is out of bed/[place] to call system/Physically safe environment - no spills, clutter or unnecessary equipment/Purposeful Proactive Rounding/Room/bathroom lighting operational, light cord in reach Communicate Risk of Fall with Harm to all staff/Reinforce activity limits and safety measures with patient and family/Tailored Fall Risk Interventions/Visual Cue: Yellow wristband and red socks/Bed in lowest position, wheels locked, appropriate side rails in place/Call bell, personal items and telephone in reach/Instruct patient to call for assistance before getting out of bed or chair/Non-slip footwear when patient is out of bed/[place] to call system/Physically safe environment - no spills, clutter or unnecessary equipment/Purposeful Proactive Rounding/Room/bathroom lighting operational, light cord in reach Communicate Risk of Fall with Harm to all staff/Reinforce activity limits and safety measures with patient and family/Tailored Fall Risk Interventions/Visual Cue: Yellow wristband and red socks/Bed in lowest position, wheels locked, appropriate side rails in place/Call bell, personal items and telephone in reach/Instruct patient to call for assistance before getting out of bed or chair/Non-slip footwear when patient is out of bed/[place] to call system/Physically safe environment - no spills, clutter or unnecessary equipment/Purposeful Proactive Rounding/Room/bathroom lighting operational, light cord in reach

## 2023-11-19 NOTE — ED ADULT NURSE REASSESSMENT NOTE - NS ED NURSE REASSESS COMMENT FT1
Cardioversion procedure attempted  Pt on CM, and zoll pads.  Given 6 mg of morphine for pain. Shocked at 100 J with no success   Given an additional dose of 2 mg of morphine  Joules increased to 120 J with no success.  Pt remains in afib with RVR

## 2023-11-19 NOTE — H&P ADULT - NSHPLABSRESULTS_GEN_ALL_CORE
13.3   8.61  )-----------( 266      ( 19 Nov 2023 14:22 )             40.7       11-19    139  |  104  |  10  ----------------------------<  100<H>  3.6   |  27  |  0.65    Ca    8.9      19 Nov 2023 14:22  Mg     2.0     11-19    TPro  7.9  /  Alb  3.6  /  TBili  0.4  /  DBili  x   /  AST  16  /  ALT  21  /  AlkPhos  97  11-19      Urinalysis Basic - ( 19 Nov 2023 14:22 )    Color: x / Appearance: x / SG: x / pH: x  Gluc: 100 mg/dL / Ketone: x  / Bili: x / Urobili: x   Blood: x / Protein: x / Nitrite: x   Leuk Esterase: x / RBC: x / WBC x   Sq Epi: x / Non Sq Epi: x / Bacteria: x        EKG:

## 2023-11-19 NOTE — ED ADULT NURSE NOTE - NSFALLUNIVINTERV_ED_ALL_ED
Bed/Stretcher in lowest position, wheels locked, appropriate side rails in place/Call bell, personal items and telephone in reach/Instruct patient to call for assistance before getting out of bed/chair/stretcher/Non-slip footwear applied when patient is off stretcher/[place] to call system/Physically safe environment - no spills, clutter or unnecessary equipment/Purposeful proactive rounding/Room/bathroom lighting operational, light cord in reach Bed/Stretcher in lowest position, wheels locked, appropriate side rails in place/Call bell, personal items and telephone in reach/Instruct patient to call for assistance before getting out of bed/chair/stretcher/Non-slip footwear applied when patient is off stretcher/[place] to call system/Physically safe environment - no spills, clutter or unnecessary equipment/Purposeful proactive rounding/Room/bathroom lighting operational, light cord in reach Bed/Stretcher in lowest position, wheels locked, appropriate side rails in place/Call bell, personal items and telephone in reach/Instruct patient to call for assistance before getting out of bed/chair/stretcher/Non-slip footwear applied when patient is off stretcher/[place] to call system/Physically safe environment - no spills, clutter or unnecessary equipment/Purposeful proactive rounding/Room/bathroom lighting operational, light cord in reach

## 2023-11-19 NOTE — ED PROCEDURE NOTE - CPROC ED TIME OUT STATEMENT1
“Patient's name, , procedure and correct site were confirmed during the [place] Timeout.” “Patient's name, , procedure and correct site were confirmed during the [place] Timeout.” “Patient's name, , procedure and correct site were confirmed during the [place] Timeout.”

## 2023-11-19 NOTE — ED PROVIDER NOTE - PROGRESS NOTE DETAILS
Trop neg.  Pt's HR increased to 130s-140s.  Given 10mg IV dilt with improvement to HR in 80s-90s.  Pt given dilt 60mg PO.  BP stable.  Discussed w Dr. Sarabia at [facility]; plan to give dose of eliquis now, admit to cardiology tele at . Trop neg.  Pt's HR increased to 130s-140s.  Given 10mg IV dilt with improvement to HR in 80s-90s.  Pt given dilt 60mg PO.  BP stable.  Discussed w Dr. Sarabia at [facility]; plan to give dose of eliquis now, admit to cardiology tele at . Trop neg.  Pt's HR increased to 130s-140s.  Given 10mg IV dilt with improvement to HR in 80s-90s.  Pt given dilt 60mg PO.  BP stable.  Discussed w Dr. Sarabia at [facility]; plan to give dose of eliquis now, admit to cardiology tele at .

## 2023-11-19 NOTE — H&P ADULT - PROBLEM SELECTOR PLAN 2
Syncope while on toilet, likely 2/2 afib/vasovagal  - Now in NSR.   - Continue to monitor on tele  - Echo in AM    F: None  E: Replete if K<4 or Mag<2  N: DASH Diet  VTEppx: SCDs  Dispo: Pending EP recs Syncope while on toilet, likely 2/2 afib/vasovagal  - Now in NSR   - Continue to monitor on tele  - Echo in AM    F: None  E: Replete if K<4 or Mag<2  N: DASH Diet  VTEppx: SCDs  Dispo: Pending EP recs

## 2023-11-20 DIAGNOSIS — R00.2 PALPITATIONS: ICD-10-CM

## 2023-11-20 DIAGNOSIS — I48.91 UNSPECIFIED ATRIAL FIBRILLATION: ICD-10-CM

## 2023-11-20 LAB
A1C WITH ESTIMATED AVERAGE GLUCOSE RESULT: 5.1 % — SIGNIFICANT CHANGE UP (ref 4–5.6)
ANION GAP SERPL CALC-SCNC: 7 MMOL/L — SIGNIFICANT CHANGE UP (ref 5–17)
BUN SERPL-MCNC: 10 MG/DL — SIGNIFICANT CHANGE UP (ref 7–23)
CALCIUM SERPL-MCNC: 8.8 MG/DL — SIGNIFICANT CHANGE UP (ref 8.4–10.5)
CHLORIDE SERPL-SCNC: 107 MMOL/L — SIGNIFICANT CHANGE UP (ref 96–108)
CHOLEST SERPL-MCNC: 142 MG/DL — SIGNIFICANT CHANGE UP
CO2 SERPL-SCNC: 24 MMOL/L — SIGNIFICANT CHANGE UP (ref 22–31)
CREAT SERPL-MCNC: 0.58 MG/DL — SIGNIFICANT CHANGE UP (ref 0.5–1.3)
EGFR: 117 ML/MIN/1.73M2 — SIGNIFICANT CHANGE UP
ESTIMATED AVERAGE GLUCOSE: 100 MG/DL — SIGNIFICANT CHANGE UP (ref 68–114)
GLUCOSE SERPL-MCNC: 101 MG/DL — HIGH (ref 70–99)
HCT VFR BLD CALC: 36.6 % — SIGNIFICANT CHANGE UP (ref 34.5–45)
HDLC SERPL-MCNC: 43 MG/DL — LOW
HGB BLD-MCNC: 11.9 G/DL — SIGNIFICANT CHANGE UP (ref 11.5–15.5)
LIPID PNL WITH DIRECT LDL SERPL: 89 MG/DL — SIGNIFICANT CHANGE UP
MAGNESIUM SERPL-MCNC: 2 MG/DL — SIGNIFICANT CHANGE UP (ref 1.6–2.6)
MCHC RBC-ENTMCNC: 29.8 PG — SIGNIFICANT CHANGE UP (ref 27–34)
MCHC RBC-ENTMCNC: 32.5 GM/DL — SIGNIFICANT CHANGE UP (ref 32–36)
MCV RBC AUTO: 91.5 FL — SIGNIFICANT CHANGE UP (ref 80–100)
NON HDL CHOLESTEROL: 99 MG/DL — SIGNIFICANT CHANGE UP
NRBC # BLD: 0 /100 WBCS — SIGNIFICANT CHANGE UP (ref 0–0)
PLATELET # BLD AUTO: 260 K/UL — SIGNIFICANT CHANGE UP (ref 150–400)
POTASSIUM SERPL-MCNC: 3.7 MMOL/L — SIGNIFICANT CHANGE UP (ref 3.5–5.3)
POTASSIUM SERPL-SCNC: 3.7 MMOL/L — SIGNIFICANT CHANGE UP (ref 3.5–5.3)
RBC # BLD: 4 M/UL — SIGNIFICANT CHANGE UP (ref 3.8–5.2)
RBC # FLD: 12.8 % — SIGNIFICANT CHANGE UP (ref 10.3–14.5)
SODIUM SERPL-SCNC: 138 MMOL/L — SIGNIFICANT CHANGE UP (ref 135–145)
TRIGL SERPL-MCNC: 50 MG/DL — SIGNIFICANT CHANGE UP
WBC # BLD: 7.99 K/UL — SIGNIFICANT CHANGE UP (ref 3.8–10.5)
WBC # FLD AUTO: 7.99 K/UL — SIGNIFICANT CHANGE UP (ref 3.8–10.5)

## 2023-11-20 PROCEDURE — 93306 TTE W/DOPPLER COMPLETE: CPT | Mod: 26

## 2023-11-20 PROCEDURE — 93010 ELECTROCARDIOGRAM REPORT: CPT

## 2023-11-20 PROCEDURE — 99233 SBSQ HOSP IP/OBS HIGH 50: CPT

## 2023-11-20 RX ORDER — POTASSIUM CHLORIDE 20 MEQ
40 PACKET (EA) ORAL ONCE
Refills: 0 | Status: COMPLETED | OUTPATIENT
Start: 2023-11-20 | End: 2023-11-20

## 2023-11-20 RX ADMIN — Medication 40 MILLIEQUIVALENT(S): at 17:23

## 2023-11-20 NOTE — PROGRESS NOTE ADULT - NSPROGADDITIONALINFOA_GEN_ALL_CORE
Attending Attestation:  I was physically present for the key portions of the evaluation and management (E/M) service provided.  I agree with the above history, physical, and plan which I have reviewed with the following edits/addendum:    - reviewed echo, normal biV function w no signif valve dse  - currently sinus bradycardia, favor no standing AVN blockers. will send home on PRN beta blockers  - discussed with Dr Marino, outpt cardiologist, to perform ischemia eval before discharge. discussed at length w pt options CCTA vs stress echo, she prefers the latter.     Rory Blas MD  Cardiology    35 minutes spent on total encounter; more than 50% of the visit was spent counseling and/or coordinating care by the attending physician. Attending Attestation:  I was physically present for the key portions of the evaluation and management (E/M) service provided.  I agree with the above history, physical, and plan which I have reviewed with the following edits/addendum:    - reviewed echo, normal biV function w no signif valve dse  - suspect vagal episode causing syncope by history  - currently sinus bradycardia: no standing AVN blockers, will send home on PRN beta blockers in case reverts back to Afib  - discussed with Dr Marino, outpt cardiologist, to perform ischemia eval before discharge. discussed at length w pt options CCTA vs stress echo, she prefers the latter    Rory Blas MD  Cardiology    35 minutes spent on total encounter; more than 50% of the visit was spent counseling and/or coordinating care by the attending physician.

## 2023-11-20 NOTE — PROGRESS NOTE ADULT - SUBJECTIVE AND OBJECTIVE BOX
CARDIOLOGY NP PROGRESS NOTE    Subjective: Pt seen and examined at bedside. Reports feeling mild chest pain this afternoon. Denies sob, lightheadedness, dizziness, palpitations, fever, chills.  Remainder ROS otherwise negative.    Overnight Events: none    TELEMETRY: SB 50s         VITAL SIGNS:  T(C): 36.8 (11-20-23 @ 11:28), Max: 36.8 (11-19-23 @ 18:57)  HR: 68 (11-20-23 @ 14:57) (54 - 130)  BP: 142/73 (11-20-23 @ 14:57) (112/63 - 142/73)  RR: 16 (11-20-23 @ 14:57) (14 - 18)  SpO2: 97% (11-20-23 @ 14:57) (97% - 99%)  Wt(kg): --    I&O's Summary    19 Nov 2023 07:01  -  20 Nov 2023 07:00  --------------------------------------------------------  IN: 0 mL / OUT: 0 mL / NET: 0 mL    20 Nov 2023 07:01  -  20 Nov 2023 16:31  --------------------------------------------------------  IN: 0 mL / OUT: 0 mL / NET: 0 mL          PHYSICAL EXAM:    General: A/ox 3, No acute Distress  Neck: Supple, NO JVD  Cardiac: S1 S2, No M/R/G  Pulmonary: CTAB, Breathing unlabored, No Rhonchi/Rales/Wheezing  Abdomen: Soft, Non -tender, +BS x 4 quads  Extremities: No Rashes, No edema  Neuro: A/o x 3, No focal deficits          LABS:                          11.9   7.99  )-----------( 260      ( 20 Nov 2023 05:30 )             36.6                              11-20    138  |  107  |  10  ----------------------------<  101<H>  3.7   |  24  |  0.58    Ca    8.8      20 Nov 2023 05:30  Mg     2.0     11-20    TPro  7.9  /  Alb  3.6  /  TBili  0.4  /  DBili  x   /  AST  16  /  ALT  21  /  AlkPhos  97  11-19    LIVER FUNCTIONS - ( 19 Nov 2023 14:22 )  Alb: 3.6 g/dL / Pro: 7.9 g/dL / ALK PHOS: 97 U/L / ALT: 21 U/L / AST: 16 U/L / GGT: x                                   CAPILLARY BLOOD GLUCOSE                Allergies:  No Known Allergies    MEDICATIONS  (STANDING):  influenza   Vaccine 0.5 milliLiter(s) IntraMuscular once  potassium chloride    Tablet ER 40 milliEquivalent(s) Oral once    MEDICATIONS  (PRN):        DIAGNOSTIC TESTS:     < from: TTE Echo Complete w/o Contrast w/ Doppler (11.20.23 @ 09:28) >  CONCLUSIONS:     1. Normal left and right ventricular size and systolic function.   2. No significant valvular disease.   3. No evidence of pulmonary hypertension.   4. No pericardial effusion.    < end of copied text >

## 2023-11-20 NOTE — PROGRESS NOTE ADULT - PROBLEM SELECTOR PLAN 1
Hx of pAfib, s/p ablation 2014 (no longer on AC or BB). Presented to [facility] in afib (see interventions below). Upon arrival to [facility], self converted to NSR. HD stable.  - S/p NS 1L bolus, Cardizem 30mg POx2, Cardizem 10mg IVPx1, Eliquis 5mg POx1  - Cardioversion attempted twice @ [facility] - first attempt was made at 100J patient converted to NSR however after patient started to cry HR once again elevated and went back in Afib. Second attempt was made at 120J and was again not successful converting to NSR however HR improved.  - [id number] of 1  - EP consult in AM for ?ablation  - Continue to monitor on tele  - NPO after NM for possible intervention w EP Hx of pAfib, s/p ablation 2014 (no longer on AC or BB). Presented to [facility] in afib (see interventions below). Upon arrival to [facility], self converted to NSR. HD stable.  - S/p NS 1L bolus, Cardizem 30mg POx2, Cardizem 10mg IVPx1, Eliquis 5mg POx1  - Cardioversion attempted twice @ [facility] - first attempt was made at 100J patient converted to NSR however after patient started to cry HR once again elevated and went back in Afib. Second attempt was made at 120J and was again not successful converting to NSR however HR improved.  - [id number] of 1  - EP consult in AM for ?ablation  - Continue to monitor on tele  - NPO after NM for possible intervention w EP Hx of pAfib, s/p ablation 2014 (no longer on AC or BB). Presented to [facility] in afib (see interventions below). Upon arrival to [facility], self converted to NSR. HD stable.  - S/p NS 1L bolus, Cardizem 30mg POx2, Cardizem 10mg IVPx1, Eliquis 5mg POx1  - Cardioversion attempted twice @ [facility] - first attempt was made at 100J patient converted to NSR however after patient started to cry HR once again elevated and went back in Afib. Second attempt was made at 120J and was again not successful converting to NSR however HR improved.  - [id number] of 1  - EP consult in AM for ?ablation  - Continue to monitor on tele  - NPO after NM for possible intervention w EP Hx of pAfib, s/p ablation 2014 (no longer on AC or BB). Presented to [facility] in Afib (see interventions below). Upon transfer to [facility], self converted to NSR.    - S/p NS 1L bolus, Cardizem 30mg POx2, Cardizem 10mg IVPx1, Eliquis 5mg POx1  - Cardioversion attempted twice @ [facility] ED - first attempt was made at 100J patient converted to NSR however after patient started to cry HR once again elevated and went back in Afib. Second attempt was made at 120J and was again not successful converting to NSR however HR improved.  - [id number] of 1     - currently SB 50-60s  - consider initiation BB vs Flecainide (pill in pocket) for recurrent Afib symptoms Hx of pAfib, s/p ablation 2014 (no longer on AC or BB). Presented to [facility] in Afib (see interventions below). Upon transfer to [facility], self converted to NSR.    - S/p NS 1L bolus, Cardizem 30mg POx2, Cardizem 10mg IVPx1, Eliquis 5mg POx1  - Cardioversion attempted twice @ [facility] ED - first attempt was made at 100J patient converted to NSR however after patient started to cry HR once again elevated and went back in Afib. Second attempt was made at 120J and was again not successful converting to NSR however HR improved.  - [id number] of 1     - currently SB 50-60s  - consider initiation BB vs Flecainide (pill in pocket) for recurrent Afib symptoms Hx of pAfib, s/p ablation 2014 (no longer on AC or BB). Presented to [facility] in Afib (see interventions below). Upon transfer to [facility], self converted to NSR.    - S/p NS 1L bolus, Cardizem 30mg POx2, Cardizem 10mg IVPx1, Eliquis 5mg POx1  - Cardioversion attempted twice @ [facility] ED - first attempt was made at 100J patient converted to NSR however after patient started to cry HR once again elevated and went back in Afib. Second attempt was made at 120J and was again not successful converting to NSR however HR improved.  - [id number] of 1     - currently SB 50-60s  - consider initiation BB vs Flecainide (pill in pocket) for recurrent Afib symptoms

## 2023-11-20 NOTE — PROGRESS NOTE ADULT - PROBLEM SELECTOR PLAN 2
Syncope while on toilet, likely 2/2 afib/vasovagal  - Now in NSR   - Continue to monitor on tele  - Echo in AM    F: None  E: Replete if K<4 or Mag<2  N: DASH Diet  VTEppx: SCDs  Dispo: Pending EP recs Syncope while on toilet, likely 2/2 Afib/vasovagal  - Continue to monitor on tele  - Echo in AM    F: None  E: Replete if K<4 or Mag<2  N: DASH Diet  VTEppx: SCDs  Dispo: Pending Syncope episode while on toilet seat (not having BM/urination). Pt reports felt nervous, lightheaded, and s/p witnessed syncopal episode by  for few secs, likely 2/2 Afib/vasovagal. Now c/o mild chest pressure this afternoon at rest.   - Echo 11/20/23: EF 65%, no significant valvular disease  - EKG w/ SB 50s, TWi inferiorly  - Pending stress echo in AM    F: None  E: Replete if K<4 or Mag<2  N: DASH Diet  VTEppx: SCDs  Dispo: Pending stress echo Syncope episode while on toilet seat (not having BM/urination). Pt reports felt nervous, lightheaded, and s/p witnessed syncopal episode by  for few secs, likely 2/2 Afib/vasovagal. Now c/o mild chest pressure this afternoon at rest.   - Orthostatic neg  - Echo 11/20/23: EF 65%, no significant valvular disease  - EKG w/ SB 50s, TWi inferiorly  - Pending stress echo in AM    F: None  E: Replete if K<4 or Mag<2  N: DASH Diet  VTEppx: SCDs  Dispo: Pending stress echo

## 2023-11-20 NOTE — PROGRESS NOTE ADULT - ASSESSMENT
38F PMHx of AF s/p ablation (2014 - no longer on AC or metoprolol) who initially presented to [facility] ED on 11/18/23 PM with complaints of palpitations that began after lifting stroller w/ child [place]. At [facility], pt underwent unsuccessful cardioverted x2, given Cardizem PO & IV and Eliquis with improvement in rates. Pt was discharged home w outpt follow up however returned to [facility] after syncopal episode. Pt now transferred to [facility] for futher afib management. Upon arrival to [facility], pt self converted to NSR.  38F PMHx of AF s/p ablation (2014 - no longer on AC or metoprolol) who initially presented to [facility] ED on 11/18/23 PM with complaints of palpitations that began after lifting stroller w/ child [place]. At [facility], pt underwent unsuccessful cardioverted x2, given Cardizem PO & IV and Eliquis with improvement in rates. Pt was discharged home w outpt follow up however returned to [facility] after syncopal episode. Pt now transferred to [facility] for futher afib management. Upon arrival to [facility], pt self converted to NSR.  38F PMHx of AF s/p ablation (2014 - no longer on AC or metoprolol) who initially presented to [facility] ED on 11/18/23 PM with complaints of palpitations that began after lifting stroller w/ child [place]. At [facility], pt underwent unsuccessful cardioverted x2, given Cardizem PO & IV and Eliquis with improvement in rates. Pt was discharged home w outpt follow up however returned to [facility] after syncopal episode. Pt now transferred to [facility] for futher afib management. Upon arrival to [facility], pt self converted to NSR.  38F PMHx of panic attacks, AF s/p ablation (2014 - no longer on AC or metoprolol), initially presented to [facility] ED on 11/18/23 PM with complaints of palpitations that began after lifting stroller w/ child [place]. At [facility], found to be in Afib s/p  unsuccessful cardiovesion x2, given Cardizem PO & IV and Eliquis with improvement in rates. Pt was discharged home w/ outpt follow up, however returned to [facility] after syncopal episode. Transferred to [facility] cardiac tele for further Afib and syncope work up, self converted to NSR prior to arrival. Now pending stress echo. 38F PMHx of panic attacks, AF s/p ablation (2014 - no longer on AC or metoprolol), initially presented to [facility] ED on 11/18/23 PM with complaints of palpitations that began after lifting stroller w/ child [place]. At [facility], found to be in Afib s/p  unsuccessful cardiovesion x2, given Cardizem PO & IV and Eliquis with improvement in rates. Pt was discharged home w/ outpt follow up, however returned to [facility] after syncopal episode. Transferred to [facility] cardiac tele for further Afib and syncope work up, self converted to NSR prior to arrival. Now pending stress echo. 38F PMHx of panic attacks, AF s/p ablation (2014 - no longer on AC or metoprolol), initially presented to [facility] ED on 11/18/23 PM with complaints of palpitations that began after lifting stroller w/ child [place]. At [facility], found to be in Afib s/p  unsuccessful cardiovesion x2, given Cardizem PO & IV and Eliquis with improvement in rates. Pt was discharged home w/ outpt follow up, however returned to [facility] after syncopal episode. Transferred to [facility] cardiac tele for further Afib and syncope work up, self converted to NSR prior to arrival. Now pending stress echo. 38F PMHx of panic attacks, AF s/p ablation (2014 - no longer on AC or metoprolol), initially presented to [facility] ED on 11/18/23 PM with complaints of palpitations that began after lifting stroller w/ child [place]. At [facility], found to be in Afib s/p unsuccessful DCCV x2, given Cardizem PO & IV and Eliquis with improvement in rates. Pt was discharged home w/ outpt follow up, however returned to [facility] after syncopal episode. Transferred to [facility] cardiac tele for further Afib and syncope work up, self converted to NSR prior to arrival. Now pending stress echo. 38F PMHx of panic attacks, AF s/p ablation (2014 - no longer on AC or metoprolol), initially presented to [facility] ED on 11/18/23 PM with complaints of palpitations that began after lifting stroller w/ child [place]. At [facility], found to be in Afib s/p unsuccessful DCCV x2, given Cardizem PO & IV and Eliquis with improvement in rates. Pt was discharged home w/ outpt follow up, however returned to [facility] after syncopal episode. Transferred to [facility] cardiac tele for further Afib and syncope work up, self converted to NSR prior to arrival. Now pending stress echo. 38F PMHx of panic attacks, AF s/p ablation (2014 - no longer on AC or metoprolol), initially presented to [facility] ED on 11/18/23 PM with complaints of palpitations that began after lifting stroller w/ child [place]. At [facility], found to be in Afib s/p unsuccessful DCCV x2, given Cardizem PO & IV and Eliquis with improvement in rates. Pt was discharged home w/ outpt follow up, however returned to [facility] after syncopal episode. Transferred to [facility] cardiac tele for further Afib and syncope work up, self converted to NSR prior to arrival. Now pending stress echo.

## 2023-11-21 ENCOUNTER — TRANSCRIPTION ENCOUNTER (OUTPATIENT)
Age: 38
End: 2023-11-21

## 2023-11-21 VITALS
OXYGEN SATURATION: 97 % | SYSTOLIC BLOOD PRESSURE: 135 MMHG | DIASTOLIC BLOOD PRESSURE: 73 MMHG | RESPIRATION RATE: 18 BRPM | HEART RATE: 72 BPM

## 2023-11-21 LAB
ANION GAP SERPL CALC-SCNC: 7 MMOL/L — SIGNIFICANT CHANGE UP (ref 5–17)
BUN SERPL-MCNC: 12 MG/DL — SIGNIFICANT CHANGE UP (ref 7–23)
CALCIUM SERPL-MCNC: 9 MG/DL — SIGNIFICANT CHANGE UP (ref 8.4–10.5)
CHLORIDE SERPL-SCNC: 109 MMOL/L — HIGH (ref 96–108)
CO2 SERPL-SCNC: 24 MMOL/L — SIGNIFICANT CHANGE UP (ref 22–31)
CREAT SERPL-MCNC: 0.6 MG/DL — SIGNIFICANT CHANGE UP (ref 0.5–1.3)
EGFR: 116 ML/MIN/1.73M2 — SIGNIFICANT CHANGE UP
GLUCOSE SERPL-MCNC: 95 MG/DL — SIGNIFICANT CHANGE UP (ref 70–99)
HCT VFR BLD CALC: 37 % — SIGNIFICANT CHANGE UP (ref 34.5–45)
HGB BLD-MCNC: 11.9 G/DL — SIGNIFICANT CHANGE UP (ref 11.5–15.5)
MAGNESIUM SERPL-MCNC: 1.9 MG/DL — SIGNIFICANT CHANGE UP (ref 1.6–2.6)
MCHC RBC-ENTMCNC: 29.6 PG — SIGNIFICANT CHANGE UP (ref 27–34)
MCHC RBC-ENTMCNC: 32.2 GM/DL — SIGNIFICANT CHANGE UP (ref 32–36)
MCV RBC AUTO: 92 FL — SIGNIFICANT CHANGE UP (ref 80–100)
NRBC # BLD: 0 /100 WBCS — SIGNIFICANT CHANGE UP (ref 0–0)
PLATELET # BLD AUTO: 243 K/UL — SIGNIFICANT CHANGE UP (ref 150–400)
POTASSIUM SERPL-MCNC: 4.7 MMOL/L — SIGNIFICANT CHANGE UP (ref 3.5–5.3)
POTASSIUM SERPL-SCNC: 4.7 MMOL/L — SIGNIFICANT CHANGE UP (ref 3.5–5.3)
RBC # BLD: 4.02 M/UL — SIGNIFICANT CHANGE UP (ref 3.8–5.2)
RBC # FLD: 12.9 % — SIGNIFICANT CHANGE UP (ref 10.3–14.5)
SODIUM SERPL-SCNC: 140 MMOL/L — SIGNIFICANT CHANGE UP (ref 135–145)
WBC # BLD: 6.81 K/UL — SIGNIFICANT CHANGE UP (ref 3.8–10.5)
WBC # FLD AUTO: 6.81 K/UL — SIGNIFICANT CHANGE UP (ref 3.8–10.5)

## 2023-11-21 PROCEDURE — 83690 ASSAY OF LIPASE: CPT

## 2023-11-21 PROCEDURE — 93005 ELECTROCARDIOGRAM TRACING: CPT

## 2023-11-21 PROCEDURE — 99239 HOSP IP/OBS DSCHRG MGMT >30: CPT

## 2023-11-21 PROCEDURE — 84702 CHORIONIC GONADOTROPIN TEST: CPT

## 2023-11-21 PROCEDURE — 85027 COMPLETE CBC AUTOMATED: CPT

## 2023-11-21 PROCEDURE — 80053 COMPREHEN METABOLIC PANEL: CPT

## 2023-11-21 PROCEDURE — 71045 X-RAY EXAM CHEST 1 VIEW: CPT

## 2023-11-21 PROCEDURE — 87637 SARSCOV2&INF A&B&RSV AMP PRB: CPT

## 2023-11-21 PROCEDURE — 83036 HEMOGLOBIN GLYCOSYLATED A1C: CPT

## 2023-11-21 PROCEDURE — 80061 LIPID PANEL: CPT

## 2023-11-21 PROCEDURE — 96374 THER/PROPH/DIAG INJ IV PUSH: CPT

## 2023-11-21 PROCEDURE — 93306 TTE W/DOPPLER COMPLETE: CPT

## 2023-11-21 PROCEDURE — 36415 COLL VENOUS BLD VENIPUNCTURE: CPT

## 2023-11-21 PROCEDURE — 99285 EMERGENCY DEPT VISIT HI MDM: CPT

## 2023-11-21 PROCEDURE — 83735 ASSAY OF MAGNESIUM: CPT

## 2023-11-21 PROCEDURE — 84484 ASSAY OF TROPONIN QUANT: CPT

## 2023-11-21 PROCEDURE — 84443 ASSAY THYROID STIM HORMONE: CPT

## 2023-11-21 PROCEDURE — 93351 STRESS TTE COMPLETE: CPT

## 2023-11-21 PROCEDURE — 93351 STRESS TTE COMPLETE: CPT | Mod: 26,52

## 2023-11-21 PROCEDURE — 85025 COMPLETE CBC W/AUTO DIFF WBC: CPT

## 2023-11-21 PROCEDURE — 83880 ASSAY OF NATRIURETIC PEPTIDE: CPT

## 2023-11-21 PROCEDURE — 80048 BASIC METABOLIC PNL TOTAL CA: CPT

## 2023-11-21 RX ORDER — METOPROLOL TARTRATE 50 MG
1 TABLET ORAL
Qty: 90 | Refills: 0
Start: 2023-11-21 | End: 2023-12-20

## 2023-11-21 RX ORDER — APIXABAN 2.5 MG/1
1 TABLET, FILM COATED ORAL
Qty: 60 | Refills: 0
Start: 2023-11-21 | End: 2023-12-20

## 2023-11-21 RX ORDER — IBUPROFEN 200 MG
400 TABLET ORAL ONCE
Refills: 0 | Status: COMPLETED | OUTPATIENT
Start: 2023-11-21 | End: 2023-11-21

## 2023-11-21 RX ADMIN — Medication 400 MILLIGRAM(S): at 10:53

## 2023-11-21 RX ADMIN — Medication 400 MILLIGRAM(S): at 09:53

## 2023-11-21 NOTE — DIETITIAN INITIAL EVALUATION ADULT - OTHER CALCULATIONS
5'2''  pounds +-10%; %[id number]  current body wt used for energy calculations as pt falls within % IBW  adjust for age/bmi and current medical conditions  5'2''  pounds +-10%; %[id number]  current body wt used for energy calculations as pt falls within % IBW  adjust for age/bmi and current medical conditions  5'2''  pounds +-10%; %[id number]  current body wt used for energy calculations as pt falls within % IBW  adjust for age/bmi and current medical conditions

## 2023-11-21 NOTE — DIETITIAN INITIAL EVALUATION ADULT - PERTINENT LABORATORY DATA
11-21    140  |  109<H>  |  12  ----------------------------<  95  4.7   |  24  |  0.60    Ca    9.0      21 Nov 2023 05:30  Mg     1.9     11-21    TPro  7.9  /  Alb  3.6  /  TBili  0.4  /  DBili  x   /  AST  16  /  ALT  21  /  AlkPhos  97  11-19  A1C with Estimated Average Glucose Result: 5.1 % (11-20-23 @ 05:30)

## 2023-11-21 NOTE — DIETITIAN INITIAL EVALUATION ADULT - OTHER INFO
38F PMHx of panic attacks, AF s/p ablation (2014 - no longer on AC or metoprolol), initially presented to [facility] ED 11/18/23 with complaints of palpitations that began after lifting stroller with child [place]. At [facility], found to be in Afib s/p unsuccessful DCCV x2, given Cardizem PO & IV and Eliquis with improvement in rates. Pt was discharged home + outpt follow up, however returned to [facility] after syncopal episode. Transferred to [facility] cardiac tele for further Afib and syncope work up, self converted to NSR prior to arrival. Now pending stress echo.    Pt seen on 5UR. About to eat lunch. Reports hunger. Reports some mild nausea however denies GI distress otherwise - no V/D/C. PTA eating well, seems to be on a regular Diet. NKFA. No issues chewing/swallowing. Denies wt changes PTA. HDL 43. No pain. Gil 21, 1+BL foot Edema, No pressure ulcers.   Please see below for nutritions recommendations.  38F PMHx of panic attacks, AF s/p ablation (2014 - no longer on AC or metoprolol), initially presented to [facility] ED 11/18/23 with complaints of palpitations that began after lifting stroller with child while [place]. At [facility], found to be in Afib s/p unsuccessful DCCV x2, given Cardizem PO & IV and Eliquis with improvement in rates. Pt was discharged home + outpt follow up, however returned to [facility] after syncopal episode. Transferred to [facility] cardiac tele for further Afib and syncope work up, self converted to NSR prior to arrival. Now pending stress echo.    Pt seen on 5UR. About to eat lunch. Reports hunger. Reports some mild nausea however denies GI distress otherwise - no V/D/C. PTA eating well, seems to be on a regular Diet. NKFA. No issues chewing/swallowing. Denies wt changes PTA. HDL 43. No pain. Gil 21, 1+BL foot Edema, No pressure ulcers.   Please see below for nutritions recommendations.  38F PMHx of panic attacks, AF s/p ablation (2014 - no longer on AC or metoprolol), initially presented to [facility] ED 11/18/23 with complaints of palpitations that began after lifting stroller with child while [place]. At [facility], found to be in Afib s/p unsuccessful DCCV x2, given Cardizem PO & IV and Eliquis with improvement in rates. Pt was discharged home + outpt follow up, however returned to [facility] after syncopal episode. Transferred to [facility] cardiac tele for further Afib and syncope work up, self converted to NSR prior to arrival. Now pending stress echo.    Pt seen on 5UR. About to eat lunch. Reports hunger. Reports some mild nausea however denies GI distress otherwise - no V/D/C. PTA eating well, seems to be on a regular Diet. NKFA. No issues chewing/swallowing. Denies wt changes PTA. HDL 43. No pain. Gil 21, 1+BL foot Edema, No pressure ulcers.   Please see below for nutritions recommendations.

## 2023-11-21 NOTE — DIETITIAN INITIAL EVALUATION ADULT - PERSON TAUGHT/METHOD
Tips for GI distress provided. Reviewed DASH diet. Spoke about [facility] [employer]. Understanding stated, provide additional motivation as needed./verbal instruction/written material/teach back - (Patient repeats in own words)/patient instructed Tips for GI distress provided. Reviewed DASH diet. Spoke about [facility] 4 [facility]. Understanding stated, provide additional motivation as needed./verbal instruction/written material/teach back - (Patient repeats in own words)/patient instructed Tips for GI distress provided. Reviewed DASH diet. Spoke about [facility] [employer]. Understanding stated, provide additional motivation as needed./verbal instruction/written material/teach back - (Patient repeats in own words)/patient instructed

## 2023-11-21 NOTE — DISCHARGE NOTE PROVIDER - PROVIDER TOKENS
PROVIDER:[TOKEN:[[id number]],FOLLOWUP:[2 weeks],ESTABLISHEDPATIENT:[T]] PROVIDER:[TOKEN:[[id number]],SCHEDULEDAPPT:[12/01/2023],ESTABLISHEDPATIENT:[T]]

## 2023-11-21 NOTE — DISCHARGE NOTE PROVIDER - NSDCFUSCHEDAPPT_GEN_ALL_CORE_FT
[facility]  HEARTVASC [address]  Scheduled Appointment: 12/01/2023    [facility]  HEARTVASC [address]  Scheduled Appointment: 12/01/2023    Lewis Sarabia  [facility]  HEARTVASC [address]  Scheduled Appointment: 12/01/2023     [facility]  HEARTVASC [address]  Scheduled Appointment: 12/01/2023    [facility]  HEARTVASC [address]  Scheduled Appointment: 12/01/2023    Lewis Sarabia  [facility]  HEARTVASC [address]  Scheduled Appointment: 12/01/2023     [facility]  HEARTVASC [address]  Scheduled Appointment: 12/01/2023    [facility]  HEARTVASC [address]  Scheduled Appointment: 12/01/2023    Lewis Sarabia  [facility]  HEARTVASC [address]  Scheduled Appointment: 12/01/2023

## 2023-11-21 NOTE — DISCHARGE NOTE PROVIDER - HOSPITAL COURSE
38F PMHx of panic attacks, AF s/p ablation (2014 - no longer on AC or metoprolol), initially presented to [facility] ED on 11/18/23 PM with complaints of palpitations that began after lifting stroller w/ child while climbing [place]. At [facility], found to be in Afib s/p unsuccessful DCCV x2, given Cardizem PO & IV and Eliquis with improvement in rates. Pt was discharged home w/ outpt follow up, however returned to [facility] after syncopal episode. Transferred to [facility] cardiac tele for further Afib and syncope work up, self converted to NSR prior to arrival. Now pending stress echo.     Problem/Plan - 1:  ·  Problem: Afib.   ·  Plan: Hx of pAfib, s/p ablation 2014 (no longer on AC or BB). Presented to [facility] in Afib (see interventions below). Upon transfer to [facility], self converted to NSR.    - S/p NS 1L bolus, Cardizem 30mg POx2, Cardizem 10mg IVPx1, Eliquis 5mg POx1  - Cardioversion attempted twice @ [facility] ED - first attempt was made at 100J patient converted to NSR however after patient started to cry HR once again elevated and went back in Afib. Second attempt was made at 120J and was again not successful converting to NSR however HR improved.  - [id number] of 1     - currently SB 50-60s  - consider initiation BB vs Flecainide (pill in pocket) for recurrent Afib symptoms.     Problem/Plan - 2:  ·  Problem: Syncope.   ·  Plan: Syncope episode while on toilet seat (not having BM/urination). Pt reports felt nervous, lightheaded, and s/p witnessed syncopal episode by  for few secs, likely 2/2 Afib/vasovagal. Now c/o mild chest pressure this afternoon at rest.   - Orthostatic neg  - Echo 11/20/23: EF 65%, no significant valvular disease  - EKG w/ SB 50s, TWi inferiorly  - Pending stress echo 38F PMHx of panic attacks, AF s/p ablation (2014 - no longer on AC or metoprolol), initially presented to [facility] ED on 11/18/23 PM with complaints of palpitations that began after lifting stroller w/ child while climbing up [place] stairs. At [facility], found to be in Afib s/p unsuccessful DCCV x2, given Cardizem PO & IV and Eliquis with improvement in rates. Pt was discharged home w/ outpt follow up, however returned to [facility] after syncopal episode. Transferred to [facility] cardiac tele for further Afib and syncope work up, self converted to NSR prior to arrival. Now pending stress echo.     Problem/Plan - 1:  ·  Problem: Afib.   ·  Plan: Hx of pAfib, s/p ablation 2014 (no longer on AC or BB). Presented to [facility] in Afib (see interventions below). Upon transfer to [facility], self converted to NSR.    - S/p NS 1L bolus, Cardizem 30mg POx2, Cardizem 10mg IVPx1, Eliquis 5mg POx1  - Cardioversion attempted twice @ [facility] ED - first attempt was made at 100J patient converted to NSR however after patient started to cry HR once again elevated and went back in Afib. Second attempt was made at 120J and was again not successful converting to NSR however HR improved.  - [id number] of 1     - currently SB 50-60s  - consider initiation BB vs Flecainide (pill in pocket) for recurrent Afib symptoms.     Problem/Plan - 2:  ·  Problem: Syncope.   ·  Plan: Syncope episode while on toilet seat (not having BM/urination). Pt reports felt nervous, lightheaded, and s/p witnessed syncopal episode by  for few secs, likely 2/2 Afib/vasovagal. Now c/o mild chest pressure this afternoon at rest.   - Orthostatic neg  - Echo 11/20/23: EF 65%, no significant valvular disease  - EKG w/ SB 50s, TWi inferiorly  - Pending stress echo 38F PMHx of panic attacks, AF s/p ablation (2014 - no longer on AC or metoprolol), initially presented to [facility] ED on 11/18/23 PM with complaints of palpitations that began after lifting stroller w/ child while [place]. At [facility], found to be in Afib s/p unsuccessful DCCV x2, given Cardizem PO & IV and Eliquis with improvement in rates. Pt was discharged home w/ outpt follow up, however returned to [facility] after syncopal episode. Transferred to [facility] cardiac tele for further Afib and syncope work up, self converted to NSR prior to arrival. Now pending stress echo.     Problem/Plan - 1:  ·  Problem: Afib.   ·  Plan: Hx of pAfib, s/p ablation 2014 (no longer on AC or BB). Presented to [facility] in Afib (see interventions below). Upon transfer to [facility], self converted to NSR.    - S/p NS 1L bolus, Cardizem 30mg POx2, Cardizem 10mg IVPx1, Eliquis 5mg POx1  - Cardioversion attempted twice @ [facility] ED - first attempt was made at 100J patient converted to NSR however after patient started to cry HR once again elevated and went back in Afib. Second attempt was made at 120J and was again not successful converting to NSR however HR improved.  - [id number] of 1     - currently SB 50-60s  - consider initiation BB vs Flecainide (pill in pocket) for recurrent Afib symptoms.     Problem/Plan - 2:  ·  Problem: Syncope.   ·  Plan: Syncope episode while on toilet seat (not having BM/urination). Pt reports felt nervous, lightheaded, and s/p witnessed syncopal episode by  for few secs, likely 2/2 Afib/vasovagal. Now c/o mild chest pressure this afternoon at rest.   - Orthostatic neg  - Echo 11/20/23: EF 65%, no significant valvular disease  - EKG w/ SB 50s, TWi inferiorly  - Pending stress echo 38F PMHx of panic attacks, pAFib s/p ablation (2014 - no longer on AC or metoprolol), initially presented to [facility] ED on 11/18/23 PM with complaints of palpitations that began after lifting stroller w/ child while [place]. At [facility] ED, found to be in Afib, pt underwent unsuccessful DCCV x2, given Cardizem PO & IV and Eliquis with improvement in HR. Pt was discharged home w/ outpt follow up, however returned to [facility] ED that evening after syncopal episode while on toilet seat (not having BM/urination). Pt reports felt nervous, lightheaded, and s/p witnessed syncopal episode by  for few secs, likely 2/2 Afib/vasovagal. . Transferred to [facility] cardiac tele for further Afib and syncope work up, self converted to NSR prior to arrival.      Problem/Plan - 1:  ·  Problem: Afib.   ·  Plan: Hx of pAfib, s/p ablation 2014 (no longer on AC or BB). Presented to [facility] in Afib (see interventions below). Upon transfer to [facility], self converted to NSR.    - S/p NS 1L bolus, Cardizem 30mg POx2, Cardizem 10mg IVPx1, Eliquis 5mg POx1  - Cardioversion attempted twice @ [facility] ED - first attempt was made at 100J patient converted to NSR however after patient started to cry HR once again elevated and went back in Afib. Second attempt was made at 120J and was again not successful converting to NSR however HR improved.  - [id number] of 1     - currently SB 50-60s  - consider initiation BB vs Flecainide (pill in pocket) for recurrent Afib symptoms.     Problem/Plan - 2:  ·  Problem: Syncope.   ·  Plan: Syncope episode Now c/o mild chest pressure this afternoon at rest.   - Orthostatic neg  - Echo 11/20/23: EF 65%, no significant valvular disease  - EKG w/ SB 50s, TWi inferiorly  - Pending stress echo 38F PMHx of panic attacks, pAFib s/p ablation (2014 - no longer on AC or metoprolol), initially presented to [facility] ED on 11/18/23 PM with complaints of palpitations that began after lifting stroller w/ child while climbing [place]. At [facility] ED, found to be in Afib, pt underwent unsuccessful DCCV x2, given Cardizem PO & IV and Eliquis with improvement in HR. Pt was discharged home w/ outpt follow up, however returned to [facility] ED that evening after syncopal episode while on toilet seat (not having BM/urination). Pt reports felt nervous, lightheaded, and s/p witnessed syncopal episode by  for few secs, likely 2/2 Afib/vasovagal. . Transferred to [facility] cardiac tele for further Afib and syncope work up, self converted to NSR prior to arrival.      Problem/Plan - 1:  ·  Problem: Afib.   ·  Plan: Hx of pAfib, s/p ablation 2014 (no longer on AC or BB). Presented to [facility] in Afib (see interventions below). Upon transfer to [facility], self converted to NSR.    - S/p NS 1L bolus, Cardizem 30mg POx2, Cardizem 10mg IVPx1, Eliquis 5mg POx1  - Cardioversion attempted twice @ [facility] ED - first attempt was made at 100J patient converted to NSR however after patient started to cry HR once again elevated and went back in Afib. Second attempt was made at 120J and was again not successful converting to NSR however HR improved.  - [id number] of 1     - currently SB 50-60s  - consider initiation BB vs Flecainide (pill in pocket) for recurrent Afib symptoms.     Problem/Plan - 2:  ·  Problem: Syncope.   ·  Plan: Syncope episode Now c/o mild chest pressure this afternoon at rest.   - Orthostatic neg  - Echo 11/20/23: EF 65%, no significant valvular disease  - EKG w/ SB 50s, TWi inferiorly  - Pending stress echo 38F PMHx of panic attacks, pAFib s/p ablation (2014 - no longer on AC or metoprolol), initially presented to [facility] ED on 11/18/23 PM with complaints of palpitations that began after lifting stroller w/ child while [place]. At [facility] ED, found to be in Afib, pt underwent unsuccessful DCCV x2, given Cardizem PO & IV and Eliquis with improvement in HR. Pt was discharged home w/ outpt follow up, however returned to [facility] ED that evening after syncopal episode while on toilet seat (not having BM/urination). Pt reports felt nervous, lightheaded, and s/p witnessed syncopal episode by  for few secs, likely 2/2 Afib/vasovagal. . Transferred to [facility] cardiac tele for further Afib and syncope work up, self converted to NSR prior to arrival.      Problem/Plan - 1:  ·  Problem: Afib.   ·  Plan: Hx of pAfib, s/p ablation 2014 (no longer on AC or BB). Presented to [facility] in Afib (see interventions below). Upon transfer to [facility], self converted to NSR.    - S/p NS 1L bolus, Cardizem 30mg POx2, Cardizem 10mg IVPx1, Eliquis 5mg POx1  - Cardioversion attempted twice @ [facility] ED - first attempt was made at 100J patient converted to NSR however after patient started to cry HR once again elevated and went back in Afib. Second attempt was made at 120J and was again not successful converting to NSR however HR improved.  - [id number] of 1     - currently SB 50-60s  - consider initiation BB vs Flecainide (pill in pocket) for recurrent Afib symptoms.     Problem/Plan - 2:  ·  Problem: Syncope.   ·  Plan: Syncope episode Now c/o mild chest pressure this afternoon at rest.   - Orthostatic neg  - Echo 11/20/23: EF 65%, no significant valvular disease  - EKG w/ SB 50s, TWi inferiorly  - Pending stress echo 38F PMHx of panic attacks, pAFib s/p ablation (2014 - no longer on AC or metoprolol), initially presented to [facility] ED on 11/18/23 PM with complaints of palpitations that began after lifting stroller w/ child while climbing [place] stairs. At [facility] ED, found to be in Afib, pt underwent unsuccessful DCCV x2, given Cardizem PO & IV and Eliquis with improvement in HR. Pt was discharged home w/ outpt follow up, however returned to [facility] ED that evening after syncopal episode while on toilet seat (not having BM/urination). Pt reports felt nervous, lightheaded, and s/p syncopal episode witnessed by  for few secs, likely 2/2 Afib/vasovagal. Transferred to [facility] cardiac tele for further Afib and syncope work up, pt self converted to NSR prior [facility] to arrival. Pt remained Sinus w/ HR 50-70s, [id number] 1, initiated Eliquis 5mg BID x1 month after discussion. Pt to take pill in pocket strategy of Lopressor 25mg TID for recurrent Afib symptoms. Orthostatic negative. Echo w/ EF 65%, normal LV/RVSF. no significant valvular disease. Pt underwent Stress echo 11/21/23: Normal exercise stress echocardiogram.   On the day of discharge, the patient was seen and examined. Symptoms improved. Vital signs are stable. Labs and imaging reviewed. Patient is medically optimized and hemodynamically stable. Return precautions discussed, medication teach back done, and importance of physician followup emphasized. The patient verbalized understanding. 38F PMHx of panic attacks, pAFib s/p ablation (2014 - no longer on AC or metoprolol), initially presented to [facility] ED on 11/18/23 PM with complaints of palpitations that began after lifting stroller w/ child while climbing up [place] stairs. At [facility] ED, found to be in Afib, pt underwent unsuccessful DCCV x2, given Cardizem PO & IV and Eliquis with improvement in HR. Pt was discharged home w/ outpt follow up, however returned to [facility] ED that evening after syncopal episode while on toilet seat (not having BM/urination). Pt reports felt nervous, lightheaded, and s/p syncopal episode witnessed by  for few secs, likely 2/2 Afib/vasovagal. Transferred to [facility] cardiac tele for further Afib and syncope work up, pt self converted to NSR prior [facility] to arrival. Pt remained Sinus w/ HR 50-70s, [id number] 1, initiated Eliquis 5mg BID x1 month after discussion. Pt to take pill in pocket strategy of Lopressor 25mg TID for recurrent Afib symptoms. Orthostatic negative. Echo w/ EF 65%, normal LV/RVSF. no significant valvular disease. Pt underwent Stress echo 11/21/23: Normal exercise stress echocardiogram.   On the day of discharge, the patient was seen and examined. Symptoms improved. Vital signs are stable. Labs and imaging reviewed. Patient is medically optimized and hemodynamically stable. Return precautions discussed, medication teach back done, and importance of physician followup emphasized. The patient verbalized understanding. 38F PMHx of panic attacks, pAFib s/p ablation (2014 - no longer on AC or metoprolol), initially presented to [facility] ED on 11/18/23 PM with complaints of palpitations that began after lifting stroller w/ child while climbing [place] stairs. At [facility] ED, found to be in Afib, pt underwent unsuccessful DCCV x2, given Cardizem PO & IV and Eliquis with improvement in HR. Pt was discharged home w/ outpt follow up, however returned to [facility] ED that evening after syncopal episode while on toilet seat (not having BM/urination). Pt reports felt nervous, lightheaded, and s/p syncopal episode witnessed by  for few secs, likely 2/2 Afib/vasovagal. Transferred to [facility] cardiac tele for further Afib and syncope work up, pt self converted to NSR prior [facility] to arrival. Pt remained Sinus w/ HR 50-70s, [id number] 1, initiated Eliquis 5mg BID x1 month after discussion. Pt to take pill in pocket strategy of Lopressor 25mg TID for recurrent Afib symptoms. Orthostatic negative. Echo w/ EF 65%, normal LV/RVSF. no significant valvular disease. Pt underwent Stress echo 11/21/23: Normal exercise stress echocardiogram.   On the day of discharge, the patient was seen and examined. Symptoms improved. Vital signs are stable. Labs and imaging reviewed. Patient is medically optimized and hemodynamically stable. Return precautions discussed, medication teach back done, and importance of physician followup emphasized. The patient verbalized understanding.

## 2023-11-21 NOTE — DISCHARGE NOTE PROVIDER - NSDCCPCAREPLAN_GEN_ALL_CORE_FT
PRINCIPAL DISCHARGE DIAGNOSIS  Diagnosis: Syncope  Assessment and Plan of Treatment:       SECONDARY DISCHARGE DIAGNOSES  Diagnosis: Paroxysmal atrial fibrillation  Assessment and Plan of Treatment:      PRINCIPAL DISCHARGE DIAGNOSIS  Diagnosis: Syncope  Assessment and Plan of Treatment: You came into the hospital for episodes of passing out or syncope. You were monitored on cardiac telemetry which did not show abnormal cardiac arrhythmias. Syncope happens when the brain temporarily doesn't get enough blood. One of the most common reasons this happens is called "vasovagal syncope." If you have vasovagal syncope, your body has a reaction in which your heart beats too slowly or your blood vessels expand (or both) and your blood pressure drops temporarily. In some cases, vasovagal syncope is triggered by an emotional response to a stimulus, such as fear of injury, heat exposure, the sight of blood, or extreme pain.  Other forms of reflex syncope are those caused by abnormal nervous system responses to activities such as urinating, having a bowel movement, coughing, or swallowing.    -If you have a feeling that you will pass out during any activity, you should immediately lie down and elevate your legs. You can try counter-pressure maneuvers such as tensing your arms with clenched fists, leg pumping, and leg-crossing may stop a vasovagal syncopal episode, or at least delay it long enough that you can lie down with the feet elevated. Such maneuvers include: Leg crossing while tensing the leg, abdominal, and buttock muscles. Hand gripping, which involves gripping a rubber ball or similar object as hard as possible. Arm tensing, which involves gripping one hand with the other while simultaneously moving both arms away from the body.      SECONDARY DISCHARGE DIAGNOSES  Diagnosis: Paroxysmal atrial fibrillation  Assessment and Plan of Treatment: You were found to have atrial fibrillation upon arrival to the ED, you had 2 attempted cardioversions in the ED, but you ultimately self converted back to normal sinus rhythm while in the ED. In people with A-fib, the electrical signals that control the heartbeat are abnormal. As a result, the top 2 chambers of the heart stop pumping effectively, and a small amount of the blood that should move out of these chambers gets left behind. As the blood pools, it can start to form clots. These clots can travel to the brain through the blood vessels, and cause strokes. You were started on a medication called Metoprolol, take this AS NEEDED for symptooms of Afib (palpitations, fast heart rate >100). Start taking blood thinner Eliquis for 1 month to prevent the risk of stroke associated with A-fib. Monitor for signs and symptoms of bleeding such as black or bright red blood in stool, nosebleeds, difficulty stopping bleeding.

## 2023-11-21 NOTE — DISCHARGE NOTE PROVIDER - NSDCCPTREATMENT_GEN_ALL_CORE_FT
PRINCIPAL PROCEDURE  Procedure: Stress echo-exercise  Findings and Treatment: CONCLUSIONS:   1. Normal exercise stress echocardiogram.   2. No evidence of exercise induced ischemia.   3. Normal exercise electrocardiography.   4. No ECG evidence of exercise ischemia at or near maximal predicted   heart rate.   5. Negative for angina or the patient's characteristic chest pain.   6. Physiologic blood pressure response to exercise.   7. No exercise-induced arrhythmia.   8. Good functional capacity.

## 2023-11-21 NOTE — DISCHARGE NOTE NURSING/CASE MANAGEMENT/SOCIAL WORK - PATIENT PORTAL LINK FT
You can access the FollowMyHealth Patient Portal offered by [facility] by registering at the following website: [email or web address]. By joining [employer]’s FollowMyHealth portal, you will also be able to view your health information using other applications (apps) compatible with our system. You can access the FollowMyHealth Patient Portal offered by [facility] by registering at the following website: [email or web address]. By joining [employer]’s FollowMyHealth portal, you will also be able to view your health information using other applications (apps) compatible with our system. You can access the FollowMyHealth Patient Portal offered by [facility] by registering at the following website: [email or web address]. By joining [employer]’s FollowMyHealth portal, you will also be able to view your health information using other applications (apps) compatible with our system.

## 2023-11-21 NOTE — DISCHARGE NOTE NURSING/CASE MANAGEMENT/SOCIAL WORK - NSDCPEFALRISK_GEN_ALL_CORE
For information on Fall & Injury Prevention, visit: [email or web address]/[email or web address]-[email or web address]-[email or web address]-mobility OR  https://www.[facility]/news/fall-prevention-tips-to-avoid-injury OR  https://www.cdc.gov/steadi/patient.html For information on Fall & Injury Prevention, visit: [email or web address]://www.[facility]/news/fall-prevention-protects-and-maintains-health-and-mobility OR  https://www.[facility]/news/fall-prevention-tips-to-avoid-injury OR  https://www.cdc.gov/steadi/patient.html For information on Fall & Injury Prevention, visit: https://www.[facility]/news/fall-prevention-protects-and-maintains-health-and-mobility OR  https://www.[facility]/news/fall-prevention-tips-to-avoid-injury OR  https://www.cdc.gov/steadi/patient.html

## 2023-11-21 NOTE — DIETITIAN INITIAL EVALUATION ADULT - NAME AND PHONE
- Yes, [phone]  - Provided Information on [place] [address] Outpatient Weight [facility] Program. Outpatient contact provided for potential enrollment: [email or web address], [phone].  - Yes, [phone]  - Provided Information on [place] [address] Outpatient Weight Loss Program. Outpatient contact provided for potential enrollment: [email or web address], [phone].  - Yes, [phone]  - Provided Information on [place] [address] Outpatient Weight Loss Program. Outpatient contact provided for potential enrollment: [email or web address], [phone].

## 2023-11-21 NOTE — DISCHARGE NOTE PROVIDER - NSDCMRMEDTOKEN_GEN_ALL_CORE_FT
Eliquis 5 mg oral tablet: 1 tab(s) orally 2 times a day  metoprolol tartrate 25 mg oral tablet: 1 tab(s) orally 3 times a day as needed for palpitations

## 2023-11-21 NOTE — DISCHARGE NOTE PROVIDER - CARE PROVIDER_API CALL
Lewis Sarabia  Cardiovascular Disease  [address] [place] [address], Floor 3  [place] [address]  Phone: [phone]  Fax: [phone]  Established Patient  Follow Up Time: 2 weeks   Lewis Sarabia  Cardiovascular Disease  [address] [place] [address], Floor 3  [place] [address]  Phone: [phone]  Fax: [phone]  Established Patient  Follow Up Time: 2 weeks   Lewis Sarabia  Cardiovascular Disease  [address] [place] [address], Floor 3  [place] [address]  Phone: [phone]  Fax: [phone]  Established Patient  Follow Up Time: 2 weeks   Lewis Sarabia  Cardiovascular Disease  [address] [place] [address], Floor 3  [place] [address]  Phone: [phone]  Fax: [phone]  Established Patient  Scheduled Appointment: 12/01/2023   Lewis Sarabia  Cardiovascular Disease  [address] [place] [address], Floor 3  [place] [address]  Phone: [phone]  Fax: [phone]  Established Patient  Scheduled Appointment: 12/01/2023   Lewis Sarabia  Cardiovascular Disease  [address] [place] [address], Floor 3  [place] [address]  Phone: [phone]  Fax: [phone]  Established Patient  Scheduled Appointment: 12/01/2023

## 2023-11-21 NOTE — DISCHARGE NOTE PROVIDER - CARE PROVIDERS DIRECT ADDRESSES
,[email or web address]

## 2023-11-21 NOTE — DIETITIAN INITIAL EVALUATION ADULT - PROBLEM SELECTOR PLAN 1
Hx of pAfib, s/p ablation 2014 (no longer on AC or BB). Presented to [facility] in afib (see interventions below). Upon arrival to [facility], self converted to NSR. HD stable.  - S/p NS 1L bolus, Cardizem 30mg POx2, Cardizem 10mg IVPx1, Eliquis 5mg POx1  - Cardioversion attempted twice @ [facility] - first attempt was made at 100J patient converted to NSR however after patient started to cry HR once again elevated and went back in Afib. Second attempt was made at 120J and was again not successful converting to NSR however HR improved.  - [id number] of 1  - EP consult in AM for ?ablation  - Continue to monitor on tele  - NPO after NM for possible intervention w EP Hx of pAfib, s/p ablation 2014 (no longer on AC or BB). Presented to [facility] in afib (see interventions below). Upon arrival to [facility], self converted to NSR. HD stable.  - S/p NS 1L bolus, Cardizem 30mg POx2, Cardizem 10mg IVPx1, Eliquis 5mg POx1  - Cardioversion attempted twice @ [facility] - first attempt was made at 100J patient converted to NSR however after patient started to cry HR once again elevated and went back in Afib. Second attempt was made at 120J and was again not successful converting to NSR however HR improved.  - [id number] of 1  - EP consult in AM for ?ablation  - Continue to monitor on tele  - NPO after NM for possible intervention w EP Hx of pAfib, s/p ablation 2014 (no longer on AC or BB). Presented to [facility] in afib (see interventions below). Upon arrival to [facility], self converted to NSR. HD stable.  - S/p NS 1L bolus, Cardizem 30mg POx2, Cardizem 10mg IVPx1, Eliquis 5mg POx1  - Cardioversion attempted twice @ [facility] - first attempt was made at 100J patient converted to NSR however after patient started to cry HR once again elevated and went back in Afib. Second attempt was made at 120J and was again not successful converting to NSR however HR improved.  - [id number] of 1  - EP consult in AM for ?ablation  - Continue to monitor on tele  - NPO after NM for possible intervention w EP

## 2023-11-21 NOTE — DIETITIAN INITIAL EVALUATION ADULT - PROBLEM SELECTOR PLAN 2
Syncope while on toilet, likely 2/2 afib/vasovagal  - Now in NSR   - Continue to monitor on tele  - Echo in AM    F: None  E: Replete if K<4 or Mag<2  N: DASH Diet  VTEppx: SCDs  Dispo: Pending EP recs

## 2023-11-30 DIAGNOSIS — R55 SYNCOPE AND COLLAPSE: ICD-10-CM

## 2023-11-30 DIAGNOSIS — I25.10 ATHEROSCLEROTIC HEART DISEASE OF NATIVE CORONARY ARTERY WITHOUT ANGINA PECTORIS: ICD-10-CM

## 2023-11-30 DIAGNOSIS — F41.0 PANIC DISORDER [EPISODIC PAROXYSMAL ANXIETY]: ICD-10-CM

## 2023-11-30 DIAGNOSIS — R00.1 BRADYCARDIA, UNSPECIFIED: ICD-10-CM

## 2023-11-30 DIAGNOSIS — I48.0 PAROXYSMAL ATRIAL FIBRILLATION: ICD-10-CM

## 2023-12-01 ENCOUNTER — APPOINTMENT (OUTPATIENT)
Dept: HEART AND VASCULAR | Facility: CLINIC | Age: 38
End: 2023-12-01
Payer: MEDICAID

## 2023-12-01 VITALS
HEART RATE: 60 BPM | SYSTOLIC BLOOD PRESSURE: 116 MMHG | TEMPERATURE: 98.1 F | DIASTOLIC BLOOD PRESSURE: 77 MMHG | BODY MASS INDEX: 32.57 KG/M2 | HEIGHT: 62 IN | WEIGHT: 177 LBS | OXYGEN SATURATION: 98 %

## 2023-12-01 PROCEDURE — 93306 TTE W/DOPPLER COMPLETE: CPT

## 2023-12-01 PROCEDURE — 99213 OFFICE O/P EST LOW 20 MIN: CPT | Mod: 25

## 2024-02-14 ENCOUNTER — APPOINTMENT (OUTPATIENT)
Dept: HEART AND VASCULAR | Facility: CLINIC | Age: 39
End: 2024-02-14
Payer: MEDICAID

## 2024-02-14 VITALS
BODY MASS INDEX: 32.2 KG/M2 | SYSTOLIC BLOOD PRESSURE: 121 MMHG | WEIGHT: 175 LBS | HEIGHT: 62 IN | OXYGEN SATURATION: 96 % | HEART RATE: 52 BPM | DIASTOLIC BLOOD PRESSURE: 77 MMHG

## 2024-02-14 DIAGNOSIS — R07.9 CHEST PAIN, UNSPECIFIED: ICD-10-CM

## 2024-02-14 PROBLEM — I25.10 ATHEROSCLEROTIC HEART DISEASE OF NATIVE CORONARY ARTERY WITHOUT ANGINA PECTORIS: Chronic | Status: ACTIVE | Noted: 2023-11-19

## 2024-02-14 PROBLEM — I48.91 UNSPECIFIED ATRIAL FIBRILLATION: Chronic | Status: ACTIVE | Noted: 2023-08-29

## 2024-02-14 PROCEDURE — 99214 OFFICE O/P EST MOD 30 MIN: CPT

## 2024-02-14 PROCEDURE — G2211 COMPLEX E/M VISIT ADD ON: CPT | Mod: NC,1L

## 2024-02-14 PROCEDURE — 93880 EXTRACRANIAL BILAT STUDY: CPT

## 2024-02-14 NOTE — REASON FOR VISIT
[Symptom and Test Evaluation] : symptom and test evaluation [FreeTextEntry1] : She comes in for a follow up and re-evaluation. She was seen in ER secondary to AF and underwent DCCV and admitted to [facility], where she had a stress echo. She completed an echo.  Carotid done today

## 2024-02-14 NOTE — DISCUSSION/SUMMARY
[FreeTextEntry1] : pAF clinical follow up for now Carotid disease minor disease noted on exam; will manage medically with current medications and re-evaluate lipids in 3-6 months RAFAEL BEYERIRIS and I discussed her lipid panel and individualized target LDL goal. At this point, will do diet and exercise with anticipation of re-evaluating labs in 3-6 months CP Inclined towards a conservative follow up in this patient. We had a careful discussion regarding diet and exercise. Will be happy to re-evaluate.

## 2024-02-19 NOTE — ED ADULT TRIAGE NOTE - DIRECT TO ROOM CARE INITIATED:
Patient: Angelica Dietz    Procedure Summary       Date: 02/19/24 Room / Location: [place] [address] / SURGERY [facility]    Anesthesia Start: 0708 Anesthesia Stop: 0839    Procedure: RIGHT SHOULDER ARTHROSCOPY, ROTATOR CUFF REPAIR, SUBSCAP REPAIR, EXTENSIVE DEBRIDEMENT, BICEPS TENODESIS, SUBACROMIAL DECOMPRESSION (Right: Shoulder) Diagnosis: (IMPINGEMENT SYNDROME RIGHT SHOULDER REGION)    Surgeons: Dread Wagner M.D. Responsible Provider: William Park D.O.    Anesthesia Type: general, peripheral nerve block ASA Status: 3            Final Anesthesia Type: general, peripheral nerve block  Last vitals  BP   Blood Pressure : 105/71    Temp   35.9 °C (96.6 °F)    Pulse   72   Resp   18    SpO2   96 %      Anesthesia Post Evaluation    Patient location during evaluation: PACU  Patient participation: complete - patient participated  Level of consciousness: awake and alert  Pain score: 0    Airway patency: patent  Anesthetic complications: no  Cardiovascular status: hemodynamically stable  Respiratory status: acceptable  Hydration status: euvolemic    PONV: none          No notable events documented.     Nurse Pain Score: 0 (NPRS)          
29-Aug-2023 12:02

## 2024-02-22 PROBLEM — R07.9 ACUTE CHEST PAIN: Status: ACTIVE | Noted: 2023-09-05

## 2024-03-08 ENCOUNTER — APPOINTMENT (OUTPATIENT)
Dept: FAMILY MEDICINE | Facility: CLINIC | Age: 39
End: 2024-03-08
Payer: MEDICAID

## 2024-03-08 VITALS
TEMPERATURE: 97.9 F | HEART RATE: 62 BPM | WEIGHT: 181 LBS | SYSTOLIC BLOOD PRESSURE: 122 MMHG | DIASTOLIC BLOOD PRESSURE: 80 MMHG | HEIGHT: 62 IN | OXYGEN SATURATION: 99 % | BODY MASS INDEX: 33.31 KG/M2

## 2024-03-08 DIAGNOSIS — F41.0 PANIC DISORDER [EPISODIC PAROXYSMAL ANXIETY]: ICD-10-CM

## 2024-03-08 DIAGNOSIS — Z13.0 ENCOUNTER FOR SCREENING FOR OTHER SUSPECTED ENDOCRINE DISORDER: ICD-10-CM

## 2024-03-08 DIAGNOSIS — F41.8 OTHER SPECIFIED ANXIETY DISORDERS: ICD-10-CM

## 2024-03-08 DIAGNOSIS — Z13.220 ENCOUNTER FOR SCREENING FOR LIPOID DISORDERS: ICD-10-CM

## 2024-03-08 DIAGNOSIS — Z12.39 ENCOUNTER FOR OTHER SCREENING FOR MALIGNANT NEOPLASM OF BREAST: ICD-10-CM

## 2024-03-08 DIAGNOSIS — I48.0 PAROXYSMAL ATRIAL FIBRILLATION: ICD-10-CM

## 2024-03-08 DIAGNOSIS — Z78.9 OTHER SPECIFIED HEALTH STATUS: ICD-10-CM

## 2024-03-08 DIAGNOSIS — Z13.228 ENCOUNTER FOR SCREENING FOR OTHER SUSPECTED ENDOCRINE DISORDER: ICD-10-CM

## 2024-03-08 DIAGNOSIS — Z13.29 ENCOUNTER FOR SCREENING FOR OTHER SUSPECTED ENDOCRINE DISORDER: ICD-10-CM

## 2024-03-08 PROCEDURE — G2211 COMPLEX E/M VISIT ADD ON: CPT | Mod: NC,1L

## 2024-03-08 PROCEDURE — 99214 OFFICE O/P EST MOD 30 MIN: CPT | Mod: 25

## 2024-03-08 NOTE — PHYSICAL EXAM
[No Lymphadenopathy] : no lymphadenopathy [Thyroid Normal, No Nodules] : the thyroid was normal and there were no nodules present [Supple] : supple [Coordination Grossly Intact] : coordination grossly intact [No Focal Deficits] : no focal deficits [Normal Gait] : normal gait [Normal] : affect was normal and insight and judgment were intact

## 2024-03-08 NOTE — REVIEW OF SYSTEMS
[Anxiety] : anxiety [Depression] : depression [Negative] : Genitourinary [FreeTextEntry5] : +intermittent CP and palpitations

## 2024-03-08 NOTE — HISTORY OF PRESENT ILLNESS
[FreeTextEntry1] : Establish care [de-identified] : 39 yo female presents to establish care. Patient reports hx of anxiety/panic attacks and afib s/p ablation in 2014. Patient states she has since been in and out of afib. Patient was most recently evaluated at  ED in Nov 2023 due to palpations, found to be in afib again and underwent DCCV. States since then she has been in and out of afib. Not currently on medications. Patient follows with cardiology Dr. Sarabia, TTE from Dec 2023 WNL, Carotid Dopplers Feb 2024 unremarkable. In process of deciding if patient will start medication. Patient also reports hx depression and panic attacks. Patient sees psychiatrist every 2 weeks, therapist once per month or PRN. Not currently on medications but in process of discussing mediation initiation with her psychiatrist. Denies SI/HI. Last pap smear was 2 years ago, WNL. Patient has never had mammogram in past. Feeling well today.

## 2024-03-08 NOTE — HEALTH RISK ASSESSMENT
[Yes] : Yes [Monthly or less (1 pt)] : Monthly or less (1 point) [1 or 2 (0 pts)] : 1 or 2 (0 points) [No] : In the past 12 months have you used drugs other than those required for medical reasons? No [Never (0 pts)] : Never (0 points) [No falls in past year] : Patient reported no falls in the past year [1] : 2) Feeling down, depressed, or hopeless for several days (1) [PHQ-2 Positive] : PHQ-2 Positive [Audit-CScore] : 1 [de-identified] : sedentary [de-identified] : balanced [Never] : Never [[id number]] : 2

## 2024-03-11 LAB
ALBUMIN SERPL ELPH-MCNC: 4.2 G/DL
ALP BLD-CCNC: 89 U/L
ALT SERPL-CCNC: 12 U/L
ANION GAP SERPL CALC-SCNC: 12 MMOL/L
AST SERPL-CCNC: 13 U/L
BASOPHILS # BLD AUTO: 0.05 K/UL
BASOPHILS NFR BLD AUTO: 0.8 %
BILIRUB SERPL-MCNC: 0.3 MG/DL
BUN SERPL-MCNC: 9 MG/DL
CALCIUM SERPL-MCNC: 9 MG/DL
CHLORIDE SERPL-SCNC: 108 MMOL/L
CHOLEST SERPL-MCNC: 160 MG/DL
CO2 SERPL-SCNC: 23 MMOL/L
CREAT SERPL-MCNC: 0.62 MG/DL
EGFR: 115 ML/MIN/1.73M2
EOSINOPHIL # BLD AUTO: 0.11 K/UL
EOSINOPHIL NFR BLD AUTO: 1.7 %
ESTIMATED AVERAGE GLUCOSE: 114 MG/DL
GLUCOSE SERPL-MCNC: 69 MG/DL
HBA1C MFR BLD HPLC: 5.6 %
HCT VFR BLD CALC: 39 %
HDLC SERPL-MCNC: 46 MG/DL
HGB BLD-MCNC: 12.4 G/DL
IMM GRANULOCYTES NFR BLD AUTO: 0.2 %
LDLC SERPL CALC-MCNC: 101 MG/DL
LYMPHOCYTES # BLD AUTO: 2.16 K/UL
LYMPHOCYTES NFR BLD AUTO: 32.9 %
MAN DIFF?: NORMAL
MCHC RBC-ENTMCNC: 29.5 PG
MCHC RBC-ENTMCNC: 31.8 GM/DL
MCV RBC AUTO: 92.6 FL
MONOCYTES # BLD AUTO: 0.42 K/UL
MONOCYTES NFR BLD AUTO: 6.4 %
NEUTROPHILS # BLD AUTO: 3.81 K/UL
NEUTROPHILS NFR BLD AUTO: 58 %
NONHDLC SERPL-MCNC: 114 MG/DL
PLATELET # BLD AUTO: 257 K/UL
POTASSIUM SERPL-SCNC: 4.7 MMOL/L
PROT SERPL-MCNC: 7.3 G/DL
RBC # BLD: 4.21 M/UL
RBC # FLD: 13.6 %
SODIUM SERPL-SCNC: 144 MMOL/L
T3FREE SERPL-MCNC: 2.52 PG/ML
T4 FREE SERPL-MCNC: 1 NG/DL
TRIGL SERPL-MCNC: 66 MG/DL
TSH SERPL-ACNC: 1.04 UIU/ML
WBC # FLD AUTO: 6.56 K/UL

## 2024-09-27 ENCOUNTER — APPOINTMENT (OUTPATIENT)
Dept: HEART AND VASCULAR | Facility: CLINIC | Age: 39
End: 2024-09-27

## 2024-12-06 ENCOUNTER — EMERGENCY (EMERGENCY)
Facility: HOSPITAL | Age: 39
LOS: 1 days | Discharge: HOME | End: 2024-12-06
Attending: EMERGENCY MEDICINE | Admitting: EMERGENCY MEDICINE
Payer: SELF-PAY

## 2024-12-06 VITALS
DIASTOLIC BLOOD PRESSURE: 86 MMHG | TEMPERATURE: 98 F | SYSTOLIC BLOOD PRESSURE: 133 MMHG | OXYGEN SATURATION: 98 % | RESPIRATION RATE: 16 BRPM | HEART RATE: 87 BPM

## 2024-12-06 VITALS
HEART RATE: 65 BPM | RESPIRATION RATE: 18 BRPM | SYSTOLIC BLOOD PRESSURE: 132 MMHG | TEMPERATURE: 98 F | OXYGEN SATURATION: 98 % | DIASTOLIC BLOOD PRESSURE: 82 MMHG

## 2024-12-06 DIAGNOSIS — I48.0 PAROXYSMAL ATRIAL FIBRILLATION: ICD-10-CM

## 2024-12-06 DIAGNOSIS — R11.2 NAUSEA WITH VOMITING, UNSPECIFIED: ICD-10-CM

## 2024-12-06 DIAGNOSIS — R19.7 DIARRHEA, UNSPECIFIED: ICD-10-CM

## 2024-12-06 LAB
ALBUMIN SERPL ELPH-MCNC: 3.4 G/DL — SIGNIFICANT CHANGE UP (ref 3.4–5)
ALP SERPL-CCNC: 93 U/L — SIGNIFICANT CHANGE UP (ref 40–120)
ALT FLD-CCNC: 17 U/L — SIGNIFICANT CHANGE UP (ref 12–42)
ANION GAP SERPL CALC-SCNC: 4 MMOL/L — LOW (ref 9–16)
AST SERPL-CCNC: 14 U/L — LOW (ref 15–37)
BASOPHILS # BLD AUTO: 0.02 K/UL — SIGNIFICANT CHANGE UP (ref 0–0.2)
BASOPHILS NFR BLD AUTO: 0.2 % — SIGNIFICANT CHANGE UP (ref 0–2)
BILIRUB SERPL-MCNC: 0.6 MG/DL — SIGNIFICANT CHANGE UP (ref 0.2–1.2)
BUN SERPL-MCNC: 13 MG/DL — SIGNIFICANT CHANGE UP (ref 7–23)
CALCIUM SERPL-MCNC: 8.9 MG/DL — SIGNIFICANT CHANGE UP (ref 8.5–10.5)
CHLORIDE SERPL-SCNC: 108 MMOL/L — SIGNIFICANT CHANGE UP (ref 96–108)
CO2 SERPL-SCNC: 28 MMOL/L — SIGNIFICANT CHANGE UP (ref 22–31)
CREAT SERPL-MCNC: 0.79 MG/DL — SIGNIFICANT CHANGE UP (ref 0.5–1.3)
EGFR: 96 ML/MIN/1.73M2 — SIGNIFICANT CHANGE UP
EOSINOPHIL # BLD AUTO: 0.07 K/UL — SIGNIFICANT CHANGE UP (ref 0–0.5)
EOSINOPHIL NFR BLD AUTO: 0.7 % — SIGNIFICANT CHANGE UP (ref 0–6)
GLUCOSE SERPL-MCNC: 99 MG/DL — SIGNIFICANT CHANGE UP (ref 70–99)
HCG SERPL-ACNC: 1 MIU/ML — SIGNIFICANT CHANGE UP
HCT VFR BLD CALC: 40.3 % — SIGNIFICANT CHANGE UP (ref 34.5–45)
HGB BLD-MCNC: 13.2 G/DL — SIGNIFICANT CHANGE UP (ref 11.5–15.5)
IMM GRANULOCYTES NFR BLD AUTO: 0.2 % — SIGNIFICANT CHANGE UP (ref 0–0.9)
LYMPHOCYTES # BLD AUTO: 0.92 K/UL — LOW (ref 1–3.3)
LYMPHOCYTES # BLD AUTO: 9.6 % — LOW (ref 13–44)
MCHC RBC-ENTMCNC: 29.4 PG — SIGNIFICANT CHANGE UP (ref 27–34)
MCHC RBC-ENTMCNC: 32.8 G/DL — SIGNIFICANT CHANGE UP (ref 32–36)
MCV RBC AUTO: 89.8 FL — SIGNIFICANT CHANGE UP (ref 80–100)
MONOCYTES # BLD AUTO: 0.32 K/UL — SIGNIFICANT CHANGE UP (ref 0–0.9)
MONOCYTES NFR BLD AUTO: 3.3 % — SIGNIFICANT CHANGE UP (ref 2–14)
NEUTROPHILS # BLD AUTO: 8.21 K/UL — HIGH (ref 1.8–7.4)
NEUTROPHILS NFR BLD AUTO: 86 % — HIGH (ref 43–77)
NRBC # BLD: 0 /100 WBCS — SIGNIFICANT CHANGE UP (ref 0–0)
PLATELET # BLD AUTO: 247 K/UL — SIGNIFICANT CHANGE UP (ref 150–400)
POTASSIUM SERPL-MCNC: 4.3 MMOL/L — SIGNIFICANT CHANGE UP (ref 3.5–5.3)
POTASSIUM SERPL-SCNC: 4.3 MMOL/L — SIGNIFICANT CHANGE UP (ref 3.5–5.3)
PROT SERPL-MCNC: 7.9 G/DL — SIGNIFICANT CHANGE UP (ref 6.4–8.2)
RBC # BLD: 4.49 M/UL — SIGNIFICANT CHANGE UP (ref 3.8–5.2)
RBC # FLD: 12.7 % — SIGNIFICANT CHANGE UP (ref 10.3–14.5)
SODIUM SERPL-SCNC: 140 MMOL/L — SIGNIFICANT CHANGE UP (ref 132–145)
TROPONIN I, HIGH SENSITIVITY RESULT: <4 NG/L — SIGNIFICANT CHANGE UP
WBC # BLD: 9.56 K/UL — SIGNIFICANT CHANGE UP (ref 3.8–10.5)
WBC # FLD AUTO: 9.56 K/UL — SIGNIFICANT CHANGE UP (ref 3.8–10.5)

## 2024-12-06 PROCEDURE — 71045 X-RAY EXAM CHEST 1 VIEW: CPT | Mod: 26

## 2024-12-06 PROCEDURE — 99285 EMERGENCY DEPT VISIT HI MDM: CPT

## 2024-12-06 RX ORDER — SODIUM CHLORIDE 9 MG/ML
1000 INJECTION, SOLUTION INTRAMUSCULAR; INTRAVENOUS; SUBCUTANEOUS ONCE
Refills: 0 | Status: COMPLETED | OUTPATIENT
Start: 2024-12-06 | End: 2024-12-06

## 2024-12-06 RX ORDER — ONDANSETRON HYDROCHLORIDE 4 MG/1
4 TABLET, FILM COATED ORAL ONCE
Refills: 0 | Status: COMPLETED | OUTPATIENT
Start: 2024-12-06 | End: 2024-12-06

## 2024-12-06 RX ADMIN — ONDANSETRON HYDROCHLORIDE 4 MILLIGRAM(S): 4 TABLET, FILM COATED ORAL at 10:25

## 2024-12-06 RX ADMIN — SODIUM CHLORIDE 1000 MILLILITER(S): 9 INJECTION, SOLUTION INTRAMUSCULAR; INTRAVENOUS; SUBCUTANEOUS at 10:27

## 2024-12-06 NOTE — ED PROVIDER NOTE - OBJECTIVE STATEMENT
Patient reports she went to sleep in her usual state of health last night. Was awakened at 2am by abdominal discomfort and nausea. Had 3 episodes of vomiting and diarrhea over the course of the night, NBNB. When she woke up this morning, felt palpitations, like heart racing.  felt her pulse and it was irregular. connected herself to a home device that detects arrhythmias and it indicated that she was in rapid afib, max . Patient feels slight nausea now, otherwise much better. No fever, ha, cp, sob, ap. Cardiologist is Dr. Sarabia. He stopped her eliquis and beta blocker several months ago.

## 2024-12-06 NOTE — ED ADULT TRIAGE NOTE - CHIEF COMPLAINT QUOTE
BIBEMS from home. Pt states lightheadedness and palpitations since waking up this am. hx afib, anxiety.

## 2024-12-06 NOTE — ED PROVIDER NOTE - PATIENT PORTAL LINK FT
You can access the FollowMyHealth Patient Portal offered by [facility] by registering at the following website: [email or web address]. By joining [facility]’s FollowMyHealth portal, you will also be able to view your health information using other applications (apps) compatible with our system.

## 2024-12-06 NOTE — ED PROVIDER NOTE - CARE PLAN
Principal Discharge DX:	Paroxysmal atrial fibrillation  Secondary Diagnosis:	Vomiting and diarrhea   1

## 2024-12-06 NOTE — ED PROVIDER NOTE - CLINICAL SUMMARY MEDICAL DECISION MAKING FREE TEXT BOX
Patient with vomiting and diarrhea, reports episode of palpitations at home that resolved. Will check labs, EKG, reassess.

## 2024-12-06 NOTE — ED PROVIDER NOTE - CARE PROVIDER_API CALL
Lewis Sarabia  Cardiovascular Disease  [address] [place] [address], [address] 3  [place] [address]  Phone: [phone]  Fax: [phone]  Follow Up Time: 4-6 Days

## 2024-12-06 NOTE — ED ADULT NURSE NOTE - NS ED NURSE LEVEL OF CONSCIOUSNESS AFFECT
PATIENT INSTRUCTIONS  POST-SEDATION    Juan Antonoi Mihir          IMMEDIATELY FOLLOWING PROCEDURE:    Do not drive or operate machinery for the first twenty four hours after surgery. Do not make any important decisions for twenty four hours after surgery or while taking narcotic pain medications or sedatives. You should NOT BE LEFT UNATTENDED OR ALONE. A responsible adult should be with you for the rest of the day of your procedure and also during the night for your protection and safety. You may experience some light headedness. Rest at home with activity as tolerated. You may not need to go to bed, but it is important to rest for the next 24 hours. You should not engage in athletic sports such as basketball, volleyball, jogging, skating, or activities requiring refined motor skills for 24 hours. If you develop intractable nausea and vomiting or a severe headache please notify your doctor immediately. You are not expected to have any fever, but if you feel warm, take your temperature. If you have a fever 101 degrees or higher, call your doctor. If you have had an Endoscopy:   *Eat lightly for your first meal and gradually resume your normal / prescribed diet. *If you have had a colonoscopy, do not expect a normal bowel movement for approximately three days due to the cleansing of the large intestine prior to colonoscopy. ONCE YOU ARE HOME, IF YOU SHOULD HAVE:  Difficulty in breathing, persistent nausea or vomiting, bleeding you feel is excessive, or pain that is unusual, increased abdominal bloating, or any swelling, fever / chills, call your physician. If you cannot contact your physician, but feel that your signs and symptoms need a physician's attention, go to the Emergency Department. FOLLOW-UP:    Please follow up with @[email or web address]@ as scheduled or needed. Dr. Jimmy Joy MD will call you with the biopsy findings. Call Dr. Jimmy Joy MD if there are any GI concerns. [phone]    Repeat Colonoscopy in 1 year. You may be receiving a follow up phone call to ask about your care. Colonoscopy: What to Expect at [address] [place] [address]  After you have a colonoscopy, you will stay at the clinic for 1 to 2 hours until the medicines wear off. Then you can go home. But you will need to arrange for a ride. Your doctor will tell you when you can eat and do your other usual activities. Your doctor will talk to you about when you will need your next colonoscopy. Your doctor can help you decide how often you need to be checked. This will depend on the results of your test and your risk for colorectal cancer. After the test, you may be bloated or have gas pains. You may need to pass gas. If a biopsy was done or a polyp was removed, you may have streaks of blood in your stool (feces) for a few days. Problems such as heavy rectal bleeding may not occur until several weeks after the test. This isn't common. But it can happen after polyps are removed. This care sheet gives you a general idea about how long it will take for you to recover. But each person recovers at a different pace. Follow the steps below to get better as quickly as possible. How can you care for yourself at home? Activity    Rest when you feel tired. You can do your normal activities when it feels okay to do so. Diet    Follow your doctor's directions for eating. Unless your doctor has told you not to, drink plenty of fluids. This helps to replace the fluids that were lost during the colon prep. Do not drink alcohol. Medicines    Your doctor will tell you if and when you can restart your medicines. He or she will also give you instructions about taking any new medicines. If you take blood thinners, such as warfarin (Coumadin), clopidogrel (Plavix), or aspirin, be sure to talk to your doctor. He or she will tell you if and when to start taking those medicines again.  Make sure that you understand exactly what your doctor wants you to do. If polyps were removed or a biopsy was done during the test, your doctor may tell you not to take aspirin or other anti-inflammatory medicines for a few days. These include ibuprofen (Advil, Motrin) and naproxen (Aleve). Other instructions    For your safety, do not drive or operate machinery until the medicine wears off and you can think clearly. Your doctor may tell you not to drive or operate machinery until the day after your test.     Do not sign legal documents or make major decisions until the medicine wears off and you can think clearly. The anesthesia can make it hard for you to fully understand what you are agreeing to. Follow-up care is a key part of your treatment and safety. Be sure to make and go to all appointments, and call your doctor if you are having problems. It's also a good idea to know your test results and keep a list of the medicines you take. When should you call for help? Call 911 anytime you think you may need emergency care. For example, call if:    You passed out (lost consciousness). You pass maroon or bloody stools. You have trouble breathing. Call your doctor now or seek immediate medical care if:    You have pain that does not get better after you take pain medicine. You are sick to your stomach or cannot drink fluids. You have new or worse belly pain. You have blood in your stools. You have a fever. You cannot pass stools or gas. Watch closely for changes in your health, and be sure to contact your doctor if you have any problems. Where can you learn more? Go to [email or web address].[email or web address] org and sign in to your [employer] account. Enter E264 in the [employer] box to learn more about \"Colonoscopy: What to Expect at Home. \"     If you do not have an account, please click on the \"Sign Up Now\" link. Current as of:  May 12, 2017  Content Version: 11.6  © 7157-4939 Healthwise, Incorporated. Care instructions adapted under license by [facility] ([facility]). If you have questions about a medical condition or this instruction, always ask your healthcare professional. [address] any warranty or liability for your use of this information. Calm

## 2024-12-06 NOTE — ED ADULT NURSE NOTE - NSFALLUNIVINTERV_ED_ALL_ED
Bed/Stretcher in lowest position, wheels locked, appropriate side rails in place/Call bell, personal items and telephone in reach/Instruct patient to call for assistance before getting out of bed/chair/stretcher/Non-slip footwear applied when patient is off stretcher/[place] to call system/Physically safe environment - no spills, clutter or unnecessary equipment/Purposeful proactive rounding/Room/bathroom lighting operational, light cord in reach

## 2024-12-06 NOTE — ED PROVIDER NOTE - NSFOLLOWUPINSTRUCTIONS_ED_ALL_ED_FT
Acute Nausea and Vomiting    WHAT YOU NEED TO KNOW:    Acute means the nausea and vomiting starts suddenly, gets worse quickly, and lasts a short time. There are many possible causes of acute nausea and vomiting.    DISCHARGE INSTRUCTIONS:    Call your local emergency number (911 in the [place]) if:   •You have chest pain.      •You have severe pain or cramping in your abdomen.      •Your vision is blurred.      •You are confused, have a high fever, or a stiff neck.      •You have bright red blood coming from your rectum.      •Your vomit smells like bowel movement.      Return to the emergency department if:   •You have a severe headache or pain.      •You are dizzy, cold, and thirsty, and your eyes and mouth are dry.      •You are urinating very little or not at all.      •You are dizzy or lightheaded when you stand up.      •You see blood or material that looks like coffee grounds in your vomit.      Call your doctor if:   •You continue to vomit for more than 48 hours.      •Your nausea and vomiting does not get better or go away after you use medicine.      •You have questions or concerns about your condition or care.      Medicines: You may need any of the following:   •Medicines may be given to calm your stomach and stop your vomiting. You may also need medicines to help empty your stomach and bowels.      •Take your medicine as directed. Contact your healthcare provider if you think your medicine is not helping or if you have side effects. Tell your provider if you are allergic to any medicine. Keep a list of the medicines, vitamins, and herbs you take. Include the amounts, and when and why you take them. Bring the list or the pill bottles to follow-up visits. Carry your medicine list with you in case of an emergency.      Manage your symptoms:   •Rest as much as you can. Too much activity can make your nausea worse.      •Drink more liquids to prevent dehydration. Take small sips. Try drinks such as ginger ale, lemonade, water, or tea. Your provider may recommend that you drink an oral rehydration solution (ORS). ORS contains water, salts, and sugar that are needed to replace the lost body fluids.      •Eat smaller meals, more often. Try bland foods and avoid spices or strong flavors      •Do not drink alcohol. Alcohol may upset or irritate your stomach.      Follow up with your doctor as directed: Write down your questions so you remember to ask them during your visits.       Atrial Fibrillation  Atrial fibrillation (AFib) is a type of irregular or rapid heartbeat (arrhythmia). In AFib, the top part of the heart (atria) beats in an irregular pattern. This makes the heart unable to pump blood normally and effectively.    The goal of treatment is to prevent blood clots from forming, control your heart rate, or restore your heartbeat to a normal rhythm. If this condition is not treated, it can cause serious problems, such as a weakened heart muscle (cardiomyopathy) or a stroke.    What are the causes?  A heart with normal electrical activity and a heart with abnormal electrical activity.  This condition is often caused by medical conditions that damage the heart's electrical system. These include:  High blood pressure (hypertension). This is the most common cause.  Certain heart problems or conditions, such as heart failure, coronary artery disease, heart valve problems, or heart surgery.  Diabetes.  Overactive thyroid (hyperthyroidism).  Chronic kidney disease.  Certain lung conditions, such as emphysema, pneumonia, or COPD.  Obstructive sleep apnea.  In some cases, the cause of this condition is not known.    What increases the risk?  This condition is more likely to develop in:  Older adults.  Athletes who do endurance exercise.  People who have a family history of AFib.  Males.  People who are .  People who are obese.  People who smoke or misuse alcohol.  What are the signs or symptoms?  Symptoms of this condition include:  Fast or irregular heartbeats (palpitations).  Discomfort or pain in your chest.  Shortness of breath.  Sudden light-headedness or weakness.  Tiring easily during exercise or activity.  Syncope (fainting).  Sweating.  In some cases, there are no symptoms.    How is this diagnosed?  Your health care provider may detect AFib when taking your pulse. If detected, this condition may be diagnosed with:  An electrocardiogram (ECG) to check electrical signals of the heart.  An ambulatory cardiac monitor to record your heart's activity for a few days.  A transthoracic echocardiogram (TTE) to create pictures of your heart.  A transesophageal echocardiogram (ALTHEA) to create even clearer pictures of your heart.  A stress test to check your blood supply while you exercise.  Imaging tests, such as a CT scan or chest X-ray.  Blood tests.  How is this treated?  Treatment depends on underlying conditions and how you feel when you get AFib. This condition may be treated with:  Medicines to prevent blood clots or to treat heart rate or heart rhythm problems.  Electrical cardioversion to reset the heart's rhythm.  A pacemaker to correct abnormal heart rhythm.  Ablation to remove the heart tissue that sends abnormal signals.  Left atrial appendage closure to seal the area where blood clots can form.  In some cases, underlying conditions will be treated.    Follow these instructions at home:  Medicines    Take over-the counter and prescription medicines only as told by your provider.  Do not take any new medicines without talking to your provider.  If you are taking blood thinners:  Talk with your provider before taking aspirin or NSAIDs. These medicines can raise your risk of bleeding.  Take your medicines as told. Take them at the same time each day.  Do not do things that could hurt or bruise you. Be careful to avoid falls.  Wear an alert bracelet or carry a card that says that you take blood thinners.  Lifestyle    Do not use any products that contain nicotine or tobacco. These products include cigarettes, chewing tobacco, and vaping devices, such as e-cigarettes. If you need help quitting, ask your provider.  Eat heart-healthy foods. Talk with a food expert (dietitian) to make an eating plan that is right for you.  Exercise regularly as told by your provider.  Do not drink alcohol.  Lose weight if you are overweight.  General instructions    If you have obstructive sleep apnea, manage your condition as told by your provider.  Do not use diet pills unless your provider approves. Diet pills can make heart problems worse.  Keep all follow-up visits. Your provider will want to check your heart rate and rhythm regularly.  Contact a health care provider if:  You notice a change in the rate, rhythm, or strength of your heartbeat.  You are taking a blood thinner and you notice more bruising.  You tire more easily when you exercise or do heavy work.  You have a sudden change in weight.  Get help right away if:  A sign showing the "BE FAST" categories for the warning signs of a stroke: balance, eyes, face, arms, speech, and time.   You have chest pain.  You have trouble breathing.  You have side effects of blood thinners, such as blood in your vomit, poop (stool), or pee (urine), or bleeding that does not stop.  You have any symptoms of a stroke. "BE FAST" is an easy way to remember the main warning signs of a stroke:  B - Balance. Signs are dizziness, sudden trouble walking, or loss of balance.  E - Eyes. Signs are trouble seeing or a sudden change in vision.  F - Face. Signs are sudden weakness or numbness of the face, or the face or eyelid drooping on one side.  A - Arms. Signs are weakness or numbness in an arm. This happens suddenly and usually on one side of the body.  S - Speech.Signs are sudden trouble speaking, slurred speech, or trouble understanding what people say.  T - Time. Time to call emergency services. Write down what time symptoms started.  Other signs of a stroke, such as:  A sudden, severe headache with no known cause.  Nausea or vomiting.  Seizure.  These symptoms may be an emergency. Get help right away. Call 911.  Do not wait to see if the symptoms will go away.  Do not drive yourself to the hospital.  This information is not intended to replace advice given to you by your health care provider. Make sure you discuss any questions you have with your health care provider.

## 2024-12-06 NOTE — ED PROVIDER NOTE - PROGRESS NOTE DETAILS
I consulted with patient's cardiologist Dr. Sarabia. He said she did not keep her latest appointment, and the device she has is not from his office. Upon further discussion with the patient, she bought the device herself, it is not from a doctor. Patient's insurance is not active, but she wants to follow up with Dr. Sarabia again when it is. Has been in sinus rhythm throughout ED stay. Likely had episode of paroxysmal afib that self-aborted, triggered by dehydration from vomiting. f/u with cardiology next week. Return to the ED immediately if getting worse, not improving, or if having any new or troubling symptoms.

## 2024-12-06 NOTE — ED PROVIDER NOTE - CARE PROVIDERS DIRECT ADDRESSES
[email or web address]

## 2024-12-06 NOTE — ED ADULT NURSE NOTE - OBJECTIVE STATEMENT
reports chest pressure, shortness of breath and palpitations since this morning. reports has had similar feeling before. hx of a.fib and panic attacks. endorses nausea, but denies other symptoms. pt is alert and oriented x4.

## 2025-04-24 ENCOUNTER — EMERGENCY (EMERGENCY)
Facility: HOSPITAL | Age: 40
LOS: 1 days | End: 2025-04-24
Attending: EMERGENCY MEDICINE | Admitting: EMERGENCY MEDICINE
Payer: MEDICAID

## 2025-04-24 VITALS
DIASTOLIC BLOOD PRESSURE: 85 MMHG | HEART RATE: 53 BPM | SYSTOLIC BLOOD PRESSURE: 150 MMHG | RESPIRATION RATE: 16 BRPM | OXYGEN SATURATION: 99 % | TEMPERATURE: 98 F

## 2025-04-24 DIAGNOSIS — F41.9 ANXIETY DISORDER, UNSPECIFIED: ICD-10-CM

## 2025-04-24 DIAGNOSIS — R41.82 ALTERED MENTAL STATUS, UNSPECIFIED: ICD-10-CM

## 2025-04-24 DIAGNOSIS — R00.1 BRADYCARDIA, UNSPECIFIED: ICD-10-CM

## 2025-04-24 DIAGNOSIS — I48.91 UNSPECIFIED ATRIAL FIBRILLATION: ICD-10-CM

## 2025-04-24 DIAGNOSIS — I25.10 ATHEROSCLEROTIC HEART DISEASE OF NATIVE CORONARY ARTERY WITHOUT ANGINA PECTORIS: ICD-10-CM

## 2025-04-24 PROCEDURE — 99285 EMERGENCY DEPT VISIT HI MDM: CPT

## 2025-04-24 NOTE — ED ADULT TRIAGE NOTE - CHIEF COMPLAINT QUOTE
BIBA for AMS. As per EMS pt.  called because she was acting abnormal, not speaking and just making mumbling. Pt. was at baseline 15min prior to EMS arrival at  2254.

## 2025-04-25 VITALS
DIASTOLIC BLOOD PRESSURE: 86 MMHG | OXYGEN SATURATION: 100 % | SYSTOLIC BLOOD PRESSURE: 135 MMHG | RESPIRATION RATE: 16 BRPM | TEMPERATURE: 98 F | HEART RATE: 55 BPM

## 2025-04-25 LAB
ALBUMIN SERPL ELPH-MCNC: 3.3 G/DL — LOW (ref 3.4–5)
ALP SERPL-CCNC: 94 U/L — SIGNIFICANT CHANGE UP (ref 40–120)
ALT FLD-CCNC: 16 U/L — SIGNIFICANT CHANGE UP (ref 12–42)
ANION GAP SERPL CALC-SCNC: 7 MMOL/L — LOW (ref 9–16)
APAP SERPL-MCNC: <2 UG/ML — LOW (ref 10–30)
AST SERPL-CCNC: 16 U/L — SIGNIFICANT CHANGE UP (ref 15–37)
BASOPHILS # BLD AUTO: 0.03 K/UL — SIGNIFICANT CHANGE UP (ref 0–0.2)
BASOPHILS NFR BLD AUTO: 0.4 % — SIGNIFICANT CHANGE UP (ref 0–2)
BILIRUB SERPL-MCNC: 0.2 MG/DL — SIGNIFICANT CHANGE UP (ref 0.2–1.2)
BUN SERPL-MCNC: 12 MG/DL — SIGNIFICANT CHANGE UP (ref 7–23)
CALCIUM SERPL-MCNC: 8.8 MG/DL — SIGNIFICANT CHANGE UP (ref 8.5–10.5)
CHLORIDE SERPL-SCNC: 107 MMOL/L — SIGNIFICANT CHANGE UP (ref 96–108)
CO2 SERPL-SCNC: 25 MMOL/L — SIGNIFICANT CHANGE UP (ref 22–31)
CREAT SERPL-MCNC: 0.6 MG/DL — SIGNIFICANT CHANGE UP (ref 0.5–1.3)
EGFR: 116 ML/MIN/1.73M2 — SIGNIFICANT CHANGE UP
EGFR: 116 ML/MIN/1.73M2 — SIGNIFICANT CHANGE UP
EOSINOPHIL # BLD AUTO: 0.13 K/UL — SIGNIFICANT CHANGE UP (ref 0–0.5)
EOSINOPHIL NFR BLD AUTO: 1.8 % — SIGNIFICANT CHANGE UP (ref 0–6)
ETHANOL SERPL-MCNC: <3 MG/DL — SIGNIFICANT CHANGE UP
GLUCOSE SERPL-MCNC: 96 MG/DL — SIGNIFICANT CHANGE UP (ref 70–99)
HCG SERPL-ACNC: <1 MIU/ML — SIGNIFICANT CHANGE UP
HCT VFR BLD CALC: 35.3 % — SIGNIFICANT CHANGE UP (ref 34.5–45)
HGB BLD-MCNC: 11.4 G/DL — LOW (ref 11.5–15.5)
IMM GRANULOCYTES # BLD AUTO: 0.02 K/UL — SIGNIFICANT CHANGE UP (ref 0–0.07)
IMM GRANULOCYTES NFR BLD AUTO: 0.3 % — SIGNIFICANT CHANGE UP (ref 0–0.9)
LYMPHOCYTES # BLD AUTO: 2.77 K/UL — SIGNIFICANT CHANGE UP (ref 1–3.3)
LYMPHOCYTES NFR BLD AUTO: 37.5 % — SIGNIFICANT CHANGE UP (ref 13–44)
MAGNESIUM SERPL-MCNC: 1.9 MG/DL — SIGNIFICANT CHANGE UP (ref 1.6–2.6)
MCHC RBC-ENTMCNC: 29.2 PG — SIGNIFICANT CHANGE UP (ref 27–34)
MCHC RBC-ENTMCNC: 32.3 G/DL — SIGNIFICANT CHANGE UP (ref 32–36)
MCV RBC AUTO: 90.5 FL — SIGNIFICANT CHANGE UP (ref 80–100)
MONOCYTES # BLD AUTO: 0.48 K/UL — SIGNIFICANT CHANGE UP (ref 0–0.9)
MONOCYTES NFR BLD AUTO: 6.5 % — SIGNIFICANT CHANGE UP (ref 2–14)
NEUTROPHILS # BLD AUTO: 3.96 K/UL — SIGNIFICANT CHANGE UP (ref 1.8–7.4)
NEUTROPHILS NFR BLD AUTO: 53.5 % — SIGNIFICANT CHANGE UP (ref 43–77)
NRBC # BLD AUTO: 0 K/UL — SIGNIFICANT CHANGE UP (ref 0–0)
NRBC # FLD: 0 K/UL — SIGNIFICANT CHANGE UP (ref 0–0)
NRBC BLD AUTO-RTO: 0 /100 WBCS — SIGNIFICANT CHANGE UP (ref 0–0)
NT-PROBNP SERPL-SCNC: 36 PG/ML — SIGNIFICANT CHANGE UP
PCO2 BLDV: 46 MMHG — HIGH (ref 39–42)
PCP SPEC-MCNC: SIGNIFICANT CHANGE UP
PH BLDV: 7.35 — SIGNIFICANT CHANGE UP (ref 7.32–7.43)
PLATELET # BLD AUTO: 234 K/UL — SIGNIFICANT CHANGE UP (ref 150–400)
PMV BLD: 11.1 FL — SIGNIFICANT CHANGE UP (ref 7–13)
PO2 BLDV: 44 MMHG — SIGNIFICANT CHANGE UP (ref 25–45)
POTASSIUM SERPL-MCNC: 3.6 MMOL/L — SIGNIFICANT CHANGE UP (ref 3.5–5.3)
POTASSIUM SERPL-SCNC: 3.6 MMOL/L — SIGNIFICANT CHANGE UP (ref 3.5–5.3)
PROT SERPL-MCNC: 7.4 G/DL — SIGNIFICANT CHANGE UP (ref 6.4–8.2)
RBC # BLD: 3.9 M/UL — SIGNIFICANT CHANGE UP (ref 3.8–5.2)
RBC # FLD: 13.4 % — SIGNIFICANT CHANGE UP (ref 10.3–14.5)
SALICYLATES SERPL-MCNC: 0.6 MG/DL — LOW (ref 2.8–20)
SAO2 % BLDV: 70.2 % — SIGNIFICANT CHANGE UP (ref 67–88)
SODIUM SERPL-SCNC: 139 MMOL/L — SIGNIFICANT CHANGE UP (ref 132–145)
TROPONIN I, HIGH SENSITIVITY RESULT: 4.5 NG/L — SIGNIFICANT CHANGE UP
TROPONIN I, HIGH SENSITIVITY RESULT: <4 NG/L — SIGNIFICANT CHANGE UP
TSH SERPL-MCNC: 1.13 UIU/ML — SIGNIFICANT CHANGE UP (ref 0.36–3.74)
WBC # BLD: 7.39 K/UL — SIGNIFICANT CHANGE UP (ref 3.8–10.5)
WBC # FLD AUTO: 7.39 K/UL — SIGNIFICANT CHANGE UP (ref 3.8–10.5)

## 2025-04-25 PROCEDURE — 71045 X-RAY EXAM CHEST 1 VIEW: CPT | Mod: 26

## 2025-04-25 PROCEDURE — 70450 CT HEAD/BRAIN W/O DYE: CPT | Mod: 26

## 2025-04-25 PROCEDURE — 99223 1ST HOSP IP/OBS HIGH 75: CPT

## 2025-04-25 NOTE — ED CDU PROVIDER INITIAL DAY NOTE - OBJECTIVE STATEMENT
40 yo F with PMHx of Ingrid, s/p DCCV, CAD, based on chart review, BIBA for AMS. Per EMS,  found her acting erratic at home when she came back from work and noted to be mumbling, altered and incoherent. Noted similar episode in 2023, evaluated in the ED and was told that it was potentially due to anxiety. Pt is altered and unable to provide additional information currently. No apparent trauma, fall, respiratory distress, N/V, urinary/bowel incontinence, rash, focal weakness, or pain noted

## 2025-04-25 NOTE — ED CDU PROVIDER INITIAL DAY NOTE - CLINICAL SUMMARY MEDICAL DECISION MAKING FREE TEXT BOX
38 yo F with PMHx of JASON.jeannine, s/p DCCV, CAD, based on chart review, BIBA for AMS. Per EMS,  found her acting erratic at home when she came back from work and noted to be mumbling, altered and incoherent. Noted similar episode in 2023, evaluated in the ED and was told that it was potentially due to anxiety.   DDx including but not limited to the following -   substance induced psychosis, psychosomatic, metabolic encephalopathy, thyroid/electrolyte derangement, infectious process, seizure, trauma, neurological pathology     - plan - check labs, including cbc, cmp, lytes, alcohol, cardiac enzymes, hcg, tsh, UDS, EKG, FS, cardiac monitoring, CTH, CXR, tox/drug screen. Serial neuro checks and cardiac monitoring and reassess    Sx and serial reassessment suggestive psychosomatic induced, will keep in obs for serial neuro checks and monitoring

## 2025-04-25 NOTE — ED ADULT NURSE NOTE - NS ED NOTE ABUSE RESPONSE YN
Call placed to Ms. Felix in regards to message received. Colonoscopy rescheduled to 9/19. She accepted. No further issues noted.    Unable to assess due to medical condition

## 2025-04-25 NOTE — ED PROVIDER NOTE - OBJECTIVE STATEMENT
38 yo F with PMHx of Ingrid, s/p DCCV, CAD, based on chart review, BIBA for AMS. Per EMS,  found her acting erratic at home when she came back from work and noted to be mumbling, altered and incoherent. Noted similar episode in 2023, evaluated in the ED and was told that it was potentially due to anxiety. Pt is altered and unable to provide additional information currently. No apparent trauma, fall, respiratory distress, N/V, urinary/bowel incontinence, rash, focal weakness, or pain noted

## 2025-04-25 NOTE — ED CDU PROVIDER DISPOSITION NOTE - CARE PROVIDER_API CALL
Lewis Sarabia  Cardiovascular Disease  [address] [place] [address], [address]  [place] [address]  Phone: [phone]  Fax: [phone]  Follow Up Time:     Christian Swenson  Psychiatry  [address] [address]  Phone: [phone]  Fax: [phone]  Follow Up Time:     Magnolia Ruiz  Internal Medicine  [address] [address]  Phone: [phone]  Fax: [phone]  Follow Up Time:

## 2025-04-25 NOTE — ED CDU PROVIDER DISPOSITION NOTE - PROVIDER TOKENS
PROVIDER:[TOKEN:[[id number]]],PROVIDER:[TOKEN:[[id number]]],PROVIDER:[TOKEN:[[id number]]]

## 2025-04-25 NOTE — ED PROVIDER NOTE - PHYSICAL EXAMINATION
Gen - WDWN F, eyes closed, lethargic but arousable to verbal stimuli, no respiratory distress, mumbling incoherent speech intermittently   Skin - warm, dry, intact, no acute rash   HEENT - AT/NC, PERRL, mild conjunctival injection, pupils 4 mm b/l, no facial contusion or periorbital ecchymosis, o/p clear, uvula midline, airway patent, neck supple with no step off or midline tenderness, FROM   CV - S1S2, bradycardiac, regular   Resp - respiration non-labored, CTAB, symmetric bs b/l, no r/r/w  GI - NABS, soft, ND, no rebound or guarding, no palpable pulsatile mass, no CVAT b/l  MS - moving all extremities, no c/c/e, symmetric pulses b/l    Neuro - Lethargic but arousable by verbal stimuli, garbled speech, unable to ambulate due to clinical condition currently, no posturing

## 2025-04-25 NOTE — ED CDU PROVIDER DISPOSITION NOTE - CARE PROVIDERS DIRECT ADDRESSES
,[email or web address]

## 2025-04-25 NOTE — ED CDU PROVIDER DISPOSITION NOTE - NSFOLLOWUPINSTRUCTIONS_ED_ALL_ED_FT
Panic Attack  A panic attack is a sudden episode of severe anxiety, fear, or discomfort that causes physical and emotional symptoms. A panic attack may be in response to something frightening, or it may occur for no known reason.    Symptoms of a panic attack can be similar to symptoms of a heart attack or stroke. It is important to see your health care provider when you have a panic attack so that these conditions can be ruled out.    What are the causes?  A panic attack may be caused by:  An extreme, life-threatening situation, such as a war or natural disaster.  An anxiety disorder, such as post-traumatic stress disorder.  Depression.  Panic disorder.  Certain medical conditions, including heart problems, neurological conditions, and infections.  Other causes may include:  Certain over-the-counter and prescription medicines.  Supplements that increase anxiety.  Illegal drugs that increase heart rate and blood pressure, such as methamphetamine.  What increases the risk?  You are more likely to develop this condition if:  You have another mental health condition.  You use alcohol, illegal drugs, or other substances.  You are under extreme stress.  A life event is causing increased feelings of anxiety and depression.  What are the signs or symptoms?  A panic attack starts suddenly, usually lasts 5–10 minutes, and occurs with one or more of the following:  A pounding heart, or a feeling that your heart is beating irregularly or faster than normal (palpitations).  Sweating, trembling, or shaking.  Shortness of breath, feeling smothered, or feeling choked.  Chest pain or discomfort.  Nausea or a strange feeling in your stomach.  Dizziness, feeling light-headed, or feeling like you might faint.  Other symptoms may include:  Chills or hot flashes.  Numbness or tingling in your lips, hands, or feet.  Feeling confused, or feeling that you are not yourself.  Fear of losing control or of being emotionally unstable, or fear of dying.  How is this diagnosed?  Doctor speaking with a patient in the doctor's office.  A panic attack is diagnosed with an assessment by your health care provider. During the assessment, your health care provider will ask questions about:  Your history of anxiety, depression, and panic attacks.  Your medical history.  Whether you drink alcohol, use drugs, take supplements, or take medicines. Be honest about your substance use.  Your health care provider may also:  Order blood tests or other kinds of tests to rule out serious medical conditions.  Refer you to a mental health professional for further evaluation.  How is this treated?  A panic attack is a symptom of another condition. Treatment depends on the cause of the panic attack.  If the cause is a medical problem, your health care provider will treat that problem or refer you to a specialist.  If the cause is emotional, you may be given anti-anxiety medicines or referred to a counselor. Anti-anxiety medicines may reduce how often attacks happen, reduce how severe the attacks are, and lower anxiety.  If the cause is a medicine, your health care provider may tell you to stop the medicine, change your dose, or take a different medicine.  If the cause is an illegal drug, treatment may involve letting the drug wear off and taking medicine to help the drug leave your body or to stop its effects. Attacks caused by heavy drug use may continue even if you stop using the drug.  Most panic attacks go away with treatment of the underlying problem. If you have panic attacks often, you may have a condition called panic disorder.    Follow these instructions at home:  Alcohol use    Do not drink alcohol if:  Your health care provider tells you not to drink.  You are pregnant, may be pregnant, or are planning to become pregnant.  If you drink alcohol:  Limit how much you have to:  0–1 drink a day for women.  0–2 drinks a day for men.  Know how much alcohol is in your drink. In the [place] one drink equals one 12 oz bottle of beer (355 mL), one 5 oz glass of wine (148 mL), or one 1½ oz glass of hard liquor (44 mL).  General instructions    Take over-the-counter and prescription medicines only as told by your health care provider.  If you feel anxious, limit your caffeine intake.  Take good care of your physical and mental health by:  Eating a balanced diet that includes plenty of fresh fruits and vegetables, whole grains, lean meats, and low-fat dairy.  Getting plenty of rest. Try to get 7–8 hours of uninterrupted sleep each night.  Exercising regularly. Try to get 30 minutes of physical activity at least 5 days a week.  Do not use any products that contain nicotine or tobacco. These products include cigarettes, chewing tobacco, and vaping devices, such as e-cigarettes. If you need help quitting, ask your health care provider.  Keep all follow-up visits. This is important. Panic attacks may have underlying physical or emotional problems that take time to accurately diagnose.  Where to find more information  Substance Abuse and Mental Health Services Administration (SAMHSA): samhsa.gov  National [place] of Mental Health ([facility]): www.nimh.nih.gov  Contact a health care provider if:  Your symptoms do not improve, or they get worse.  You are not able to take your medicine as prescribed because of side effects.  Get help right away if:  You have thoughts about hurting yourself or others.  Get help right away if you feel like you may hurt yourself or others, or have thoughts about taking your own life. Go to your nearest emergency room or:  Call 911.  Call the National Suicide Prevention Lifeline at 1-826.965.5362 or 620. This is open 24 hours a day.  Text the Crisis Text Line at 349103.  Summary  A panic attack is a sudden episode of severe anxiety, fear, or discomfort that causes physical and emotional symptoms.  Always see a health care provider to have the reasons for the panic attack correctly diagnosed.  If your panic attack was caused by a physical problem, follow your health care provider's suggestions for medicine, referral to a specialist, and lifestyle changes.  If your panic attack was caused by an emotional problem, follow through with counseling from a qualified mental health specialist.  If you feel like you may hurt yourself or others, call 911 and get help right away.  This information is not intended to replace advice given to you by your health care provider. Make sure you discuss any questions you have with your health care provider.    Bradycardia    WHAT YOU NEED TO KNOW:    What is bradycardia? Bradycardia is a slow heart rate, usually fewer than 60 beats per minute. A slow heart rate is normal for some people, such as athletes, and needs no treatment. Bradycardia may also be caused by health conditions that do need treatment. Your healthcare provider will tell you what heart rate is too low for you.  Heart Chambers    What causes or increases my risk for bradycardia?    Heart damage caused by a heart attack or heart disease    A condition such as amyloidosis or hemochromatosis    Problems with your heart node (tissue that generates electrical signals)    Conditions that affect how electrical signals travel between atria and ventricles and cause your heart to beat    Sleep apnea (pauses in breathing during sleep)    Certain infections, such as Lyme disease, diphtheria, or endocarditis    Hypothermia (low body temperature), or health problems that cause low oxygen levels    Low thyroid hormone levels, low blood sugar levels, or electrolyte balance problems    Certain medicines, such as heart or hypertension medicines and some antidepressants    Age older than 70  What other signs and symptoms may occur with bradycardia?    Feeling tired and short of breath, or dizzy and lightheaded    Confusion    Chest pain    Cool and pale or bluish skin  How is bradycardia diagnosed? Your healthcare provider will ask about your symptoms. Tell him or her when they started and how often they happen. Tell him or her how severe your symptoms usually are, and how long they last. He or she will ask what triggers your symptoms and if anything makes them worse or better. He or she may ask if you have a heart condition or take any medicines. Tell your provider if symptoms happen after you take certain medicines. Tell him or her if you have a family history of heart conditions. You may also need any of the following:    Blood tests may be done to see if you have any heart damage. They may also be used to find the cause of your bradycardia.    Echocardiography is a type of ultrasound. Sound waves are used to create pictures of your heart as it beats.    An ECG records the electrical activity of your heart. It may be used to check for heart damage from a heart attack or problems with the way electrical signals travel through your heart. You may be asked to walk on a treadmill during this test to check if exercise triggers symptoms.    CT or MRI pictures may show problems with your heart that can cause bradycardia. Contrast liquid may be used to help your heart show up better in the pictures. Tell the healthcare provider if you had an allergic reaction to contrast liquid. Do not enter the MRI room with any metal. Metal can cause serious damage. Tell the provider if you have any metal in or on your body.    A heart monitor will be used to track your heart rate and rhythm if you are in the hospital. You may also need to wear it for several days at home.  Holter Monitor  How is bradycardia treated? You may not need any treatment. Bradycardia is usually treated if it causes symptoms, such as dizziness or fainting. The cause of your bradycardia may need to be treated. For example, you may need treatment for sleep apnea if this is causing your symptoms. Your healthcare provider will talk with you about the benefits and risks of treatment that may be right for you:    Heart medicines may be given to increase your heart rate. These medicines are given through an IV.    A temporary pacemaker is a short-term treatment in the hospital. The pacemaker is applied to your skin with sticky pads or placed into a vein in your neck or chest. A small pacing device helps keep your heartbeat stable.    A permanent pacemaker is implanted under the skin of your chest or abdomen during surgery. A tiny battery creates electrical impulses that keep your heart rate regular.  Pacemaker  What can I do to manage or prevent bradycardia?    Talk to your healthcare provider about all your current medicines. He or she may change a medicine if it is causing your slow heart rate. Do not stop taking any medicine unless directed by your provider.    Keep a record of your symptoms. Include when you have bradycardia, and what you were doing when it started. Also include anything that made your symptoms better or worse. Bring the record to follow-up visits with your healthcare providers.    Do not smoke. Nicotine and other chemicals in cigarettes and cigars can cause heart and lung damage. Ask your healthcare provider for information if you currently smoke and need help to quit. E-cigarettes or smokeless tobacco still contain nicotine. Talk to your healthcare provider before you use these products.    Reach or maintain a healthy weight. Your healthcare provider can tell you what a healthy weight is for you. He or she can help you create a weight loss plan if needed. Weight loss can help strengthen your heartbeat. If you have sleep apnea, weight loss may help you breathe more regularly while you sleep.    Exercise as directed. Exercise can help strengthen your heart. It can also help you manage your weight and improve your sleep. Your healthcare provider can help you create an exercise plan. He or she may recommend 30 to 40 minutes of exercise most days of the week.  D[employer] Walking for Exercise      Eat a variety of healthy foods. Healthy foods include fruits, vegetables, whole-grain breads and cereals, low-fat dairy products, lean meats, fish, and cooked beans. Depending on the cause of your bradycardia, your healthcare provider may recommend a heart healthy diet. This diet is low in sodium (salt) and unhealthy fats. A dietitian or your healthcare provider can help you create a heart healthy diet.    Call your local emergency number (941 in the [place]) or have someone call if:    You have new or worsening dizziness, shortness of breath, chest pain, or confusion.    When should I call my doctor?    You feel lightheaded or faint.    Your pulse rate is lower than healthcare providers say it should be, even with treatment.    You are more tired than usual, even with treatment.    You have questions or concerns about your condition or care.

## 2025-04-25 NOTE — ED ADULT NURSE NOTE - NSFALLUNIVINTERV_ED_ALL_ED
Bed/Stretcher in lowest position, wheels locked, appropriate side rails in place/Call bell, personal items and telephone in reach/Instruct patient to call for assistance before getting out of bed/chair/stretcher/Non-slip footwear applied when patient is off stretcher/[place] to call system/Physically safe environment - no spills, clutter or unnecessary equipment/Purposeful proactive rounding/Room/bathroom lighting operational, light cord in reach

## 2025-04-25 NOTE — ED CDU PROVIDER DISPOSITION NOTE - PATIENT PORTAL LINK FT
You can access the FollowMyHealth Patient Portal offered by [facility] by registering at the following website: [email or web address]. By joining [employer]’s FollowMyHealth portal, you will also be able to view your health information using other applications (apps) compatible with our system.

## 2025-04-25 NOTE — ED CDU PROVIDER DISPOSITION NOTE - CLINICAL COURSE
40 yo F with PMHx of A.fib, s/p DCCV, CAD, based on chart review, BIBA for AMS. Per EMS,  found her acting erratic at home when she came back from work and noted to be mumbling, altered and incoherent. Noted similar episode in 2023, evaluated in the ED and was told that it was potentially due to anxiety. Labs, EKG. CTH/CXR with non actionable findings. tele monitoring noted sinus bradycardia but pt without symptoms, serial CE negative, serial neuro exams with improvement in mental status and pt is currently back to baseline. AxOx4, ambulatory with steady gait, no focal neuro deficits. Reports current episode similar to her prior anxiety attacks. counseling provided, medically stable for dc to f/u with cardio and PMD. strict return precautions discussed, pt verbalized understanding

## 2025-04-25 NOTE — ED ADULT NURSE NOTE - OBJECTIVE STATEMENT
Received patient sleeping  visible rise and fall of chest noted no cardiac or respiratory distress noted.  Reason for ER visit is due to BIBA for AMS. As per EMS pt.  called because she was acting abnormal, not speaking and just making mumbling. Pt. was at baseline 15min prior to EMS arrival at  2254.

## 2025-04-25 NOTE — ED PROVIDER NOTE - CLINICAL SUMMARY MEDICAL DECISION MAKING FREE TEXT BOX
38 yo F with PMHx of JASON.jeannine, s/p DCCV, CAD, based on chart review, BIBA for AMS. Per EMS,  found her acting erratic at home when she came back from work and noted to be mumbling, altered and incoherent. Noted similar episode in 2023, evaluated in the ED and was told that it was potentially due to anxiety.   DDx including but not limited to the following -   substance induced psychosis, psychosomatic, metabolic encephalopathy, thyroid/electrolyte derangement, infectious process, trauma, neurological pathology     - plan - check labs, including cbc, cmp, lytes, alcohol, cardiac enzymes, hcg, tsh, UDS, EKG, FS, cardiac monitoring, CTH, CXR, tox/drug screen. Serial neuro checks and cardiac monitoring and reassess 40 yo F with PMHx of JASON.jeannine, s/p DCCV, CAD, based on chart review, BIBA for AMS. Per EMS,  found her acting erratic at home when she came back from work and noted to be mumbling, altered and incoherent. Noted similar episode in 2023, evaluated in the ED and was told that it was potentially due to anxiety.   DDx including but not limited to the following -   substance induced psychosis, psychosomatic, metabolic encephalopathy, thyroid/electrolyte derangement, infectious process, seizure, trauma, neurological pathology     - plan - check labs, including cbc, cmp, lytes, alcohol, cardiac enzymes, hcg, tsh, UDS, EKG, FS, cardiac monitoring, CTH, CXR, tox/drug screen. Serial neuro checks and cardiac monitoring and reassess    Sx and serial reassessment suggestive psychosomatic induced, will keep in obs for serial neuro checks and monitoring

## 2025-04-25 NOTE — ED CDU PROVIDER INITIAL DAY NOTE - PROGRESS NOTE DETAILS
Pt reassessed at bedside with no new or relapse of sx noted. Resting comfortably with no acute distress noted. more awake and coherent and oriented x 3. Reports increased stressors in the past two weeks and lack of sleep, which triggered a panic attack for her. Current episode similar to prior anxiety attacks, not on any meds, desires to rest and sleep it off. repeat neuro exam wnl, no focal neuro deficits. ambulatory with steady gait, will continue to monitor and trend serial CE tele monitoring noted sinus bradycardia to 40s during sleep but normalized to 55-60s when waken up. no pauses or prolonged intervals noted. rpt CE wnl. Pt reports not on any meds currently, s/p DCCV last yr for A.fib and recent EKG/check up consistent with her baseline two months ago per pt. will f/u with her cardiologist and desires to be discharged. AFVSS

## 2025-04-25 NOTE — ED PROVIDER NOTE - WR ORDER NAME 1
11/10 [facility] regarding appt 11/12 at 3pm    Sent Questionnaires    Consents up to date    [facility] Active  
Xray Chest 1 View AP/PA

## 2025-06-03 ENCOUNTER — EMERGENCY (EMERGENCY)
Facility: HOSPITAL | Age: 40
LOS: 1 days | End: 2025-06-03
Attending: EMERGENCY MEDICINE | Admitting: EMERGENCY MEDICINE
Payer: MEDICAID

## 2025-06-03 VITALS
SYSTOLIC BLOOD PRESSURE: 115 MMHG | DIASTOLIC BLOOD PRESSURE: 72 MMHG | OXYGEN SATURATION: 99 % | HEART RATE: 66 BPM | RESPIRATION RATE: 18 BRPM

## 2025-06-03 VITALS
RESPIRATION RATE: 18 BRPM | OXYGEN SATURATION: 99 % | TEMPERATURE: 98 F | SYSTOLIC BLOOD PRESSURE: 125 MMHG | HEART RATE: 117 BPM | DIASTOLIC BLOOD PRESSURE: 70 MMHG

## 2025-06-03 DIAGNOSIS — R06.02 SHORTNESS OF BREATH: ICD-10-CM

## 2025-06-03 DIAGNOSIS — I25.10 ATHEROSCLEROTIC HEART DISEASE OF NATIVE CORONARY ARTERY WITHOUT ANGINA PECTORIS: ICD-10-CM

## 2025-06-03 DIAGNOSIS — I48.91 UNSPECIFIED ATRIAL FIBRILLATION: ICD-10-CM

## 2025-06-03 LAB
ALBUMIN SERPL ELPH-MCNC: 3.5 G/DL — SIGNIFICANT CHANGE UP (ref 3.4–5)
ALP SERPL-CCNC: 95 U/L — SIGNIFICANT CHANGE UP (ref 40–120)
ALT FLD-CCNC: 19 U/L — SIGNIFICANT CHANGE UP (ref 12–42)
ANION GAP SERPL CALC-SCNC: 13 MMOL/L — SIGNIFICANT CHANGE UP (ref 9–16)
APPEARANCE UR: CLEAR — SIGNIFICANT CHANGE UP
APTT BLD: 31.5 SEC — SIGNIFICANT CHANGE UP (ref 26.1–36.8)
AST SERPL-CCNC: 15 U/L — SIGNIFICANT CHANGE UP (ref 15–37)
BASOPHILS # BLD AUTO: 0.04 K/UL — SIGNIFICANT CHANGE UP (ref 0–0.2)
BASOPHILS NFR BLD AUTO: 0.3 % — SIGNIFICANT CHANGE UP (ref 0–2)
BILIRUB SERPL-MCNC: 0.3 MG/DL — SIGNIFICANT CHANGE UP (ref 0.2–1.2)
BILIRUB UR-MCNC: NEGATIVE — SIGNIFICANT CHANGE UP
BUN SERPL-MCNC: 13 MG/DL — SIGNIFICANT CHANGE UP (ref 7–23)
CALCIUM SERPL-MCNC: 8.9 MG/DL — SIGNIFICANT CHANGE UP (ref 8.5–10.5)
CHLORIDE SERPL-SCNC: 107 MMOL/L — SIGNIFICANT CHANGE UP (ref 96–108)
CO2 SERPL-SCNC: 22 MMOL/L — SIGNIFICANT CHANGE UP (ref 22–31)
COLOR SPEC: YELLOW — SIGNIFICANT CHANGE UP
CREAT SERPL-MCNC: 0.65 MG/DL — SIGNIFICANT CHANGE UP (ref 0.5–1.3)
DIFF PNL FLD: ABNORMAL
EGFR: 113 ML/MIN/1.73M2 — SIGNIFICANT CHANGE UP
EGFR: 113 ML/MIN/1.73M2 — SIGNIFICANT CHANGE UP
EOSINOPHIL # BLD AUTO: 0.11 K/UL — SIGNIFICANT CHANGE UP (ref 0–0.5)
EOSINOPHIL NFR BLD AUTO: 1 % — SIGNIFICANT CHANGE UP (ref 0–6)
FLUAV AG NPH QL: SIGNIFICANT CHANGE UP
FLUBV AG NPH QL: SIGNIFICANT CHANGE UP
GLUCOSE SERPL-MCNC: 111 MG/DL — HIGH (ref 70–99)
GLUCOSE UR QL: NEGATIVE MG/DL — SIGNIFICANT CHANGE UP
HCG SERPL-ACNC: <1 MIU/ML — SIGNIFICANT CHANGE UP
HCT VFR BLD CALC: 37.2 % — SIGNIFICANT CHANGE UP (ref 34.5–45)
HGB BLD-MCNC: 12.3 G/DL — SIGNIFICANT CHANGE UP (ref 11.5–15.5)
IMM GRANULOCYTES # BLD AUTO: 0.03 K/UL — SIGNIFICANT CHANGE UP (ref 0–0.07)
IMM GRANULOCYTES NFR BLD AUTO: 0.3 % — SIGNIFICANT CHANGE UP (ref 0–0.9)
INR BLD: 1.02 — SIGNIFICANT CHANGE UP (ref 0.85–1.16)
KETONES UR QL: NEGATIVE MG/DL — SIGNIFICANT CHANGE UP
LACTATE BLDV-MCNC: 1.4 MMOL/L — SIGNIFICANT CHANGE UP (ref 0.5–2)
LEUKOCYTE ESTERASE UR-ACNC: ABNORMAL
LYMPHOCYTES # BLD AUTO: 1.57 K/UL — SIGNIFICANT CHANGE UP (ref 1–3.3)
LYMPHOCYTES NFR BLD AUTO: 13.7 % — SIGNIFICANT CHANGE UP (ref 13–44)
MAGNESIUM SERPL-MCNC: 1.7 MG/DL — SIGNIFICANT CHANGE UP (ref 1.6–2.6)
MCHC RBC-ENTMCNC: 29.7 PG — SIGNIFICANT CHANGE UP (ref 27–34)
MCHC RBC-ENTMCNC: 33.1 G/DL — SIGNIFICANT CHANGE UP (ref 32–36)
MCV RBC AUTO: 89.9 FL — SIGNIFICANT CHANGE UP (ref 80–100)
MONOCYTES # BLD AUTO: 0.55 K/UL — SIGNIFICANT CHANGE UP (ref 0–0.9)
MONOCYTES NFR BLD AUTO: 4.8 % — SIGNIFICANT CHANGE UP (ref 2–14)
NEUTROPHILS # BLD AUTO: 9.16 K/UL — HIGH (ref 1.8–7.4)
NEUTROPHILS NFR BLD AUTO: 79.9 % — HIGH (ref 43–77)
NITRITE UR-MCNC: NEGATIVE — SIGNIFICANT CHANGE UP
NRBC # BLD AUTO: 0 K/UL — SIGNIFICANT CHANGE UP (ref 0–0)
NRBC # FLD: 0 K/UL — SIGNIFICANT CHANGE UP (ref 0–0)
NRBC BLD AUTO-RTO: 0 /100 WBCS — SIGNIFICANT CHANGE UP (ref 0–0)
NT-PROBNP SERPL-SCNC: 89 PG/ML — SIGNIFICANT CHANGE UP
PH UR: 8.5 (ref 5–8)
PLATELET # BLD AUTO: 242 K/UL — SIGNIFICANT CHANGE UP (ref 150–400)
PMV BLD: 10.1 FL — SIGNIFICANT CHANGE UP (ref 7–13)
POTASSIUM SERPL-MCNC: 3.6 MMOL/L — SIGNIFICANT CHANGE UP (ref 3.5–5.3)
POTASSIUM SERPL-SCNC: 3.6 MMOL/L — SIGNIFICANT CHANGE UP (ref 3.5–5.3)
PROT SERPL-MCNC: 7.6 G/DL — SIGNIFICANT CHANGE UP (ref 6.4–8.2)
PROT UR-MCNC: NEGATIVE MG/DL — SIGNIFICANT CHANGE UP
PROTHROM AB SERPL-ACNC: 11.8 SEC — SIGNIFICANT CHANGE UP (ref 9.9–13.4)
RBC # BLD: 4.14 M/UL — SIGNIFICANT CHANGE UP (ref 3.8–5.2)
RBC # FLD: 13 % — SIGNIFICANT CHANGE UP (ref 10.3–14.5)
RSV RNA NPH QL NAA+NON-PROBE: SIGNIFICANT CHANGE UP
SARS-COV-2 RNA SPEC QL NAA+PROBE: SIGNIFICANT CHANGE UP
SODIUM SERPL-SCNC: 142 MMOL/L — SIGNIFICANT CHANGE UP (ref 132–145)
SOURCE RESPIRATORY: SIGNIFICANT CHANGE UP
SP GR SPEC: 1.02 — SIGNIFICANT CHANGE UP (ref 1–1.03)
TROPONIN I, HIGH SENSITIVITY RESULT: 4.1 NG/L — SIGNIFICANT CHANGE UP
TROPONIN I, HIGH SENSITIVITY RESULT: 4.6 NG/L — SIGNIFICANT CHANGE UP
UROBILINOGEN FLD QL: 0.2 MG/DL — SIGNIFICANT CHANGE UP (ref 0.2–1)
WBC # BLD: 11.46 K/UL — HIGH (ref 3.8–10.5)
WBC # FLD AUTO: 11.46 K/UL — HIGH (ref 3.8–10.5)

## 2025-06-03 PROCEDURE — 71275 CT ANGIOGRAPHY CHEST: CPT | Mod: 26

## 2025-06-03 PROCEDURE — 99285 EMERGENCY DEPT VISIT HI MDM: CPT

## 2025-06-03 RX ORDER — ONDANSETRON HCL/PF 4 MG/2 ML
4 VIAL (ML) INJECTION ONCE
Refills: 0 | Status: COMPLETED | OUTPATIENT
Start: 2025-06-03 | End: 2025-06-03

## 2025-06-03 RX ORDER — METOPROLOL SUCCINATE 50 MG/1
25 TABLET, EXTENDED RELEASE ORAL ONCE
Refills: 0 | Status: DISCONTINUED | OUTPATIENT
Start: 2025-06-03 | End: 2025-06-03

## 2025-06-03 RX ORDER — METOPROLOL SUCCINATE 50 MG/1
5 TABLET, EXTENDED RELEASE ORAL ONCE
Refills: 0 | Status: DISCONTINUED | OUTPATIENT
Start: 2025-06-03 | End: 2025-06-03

## 2025-06-03 RX ADMIN — Medication 1000 MILLILITER(S): at 05:42

## 2025-06-03 RX ADMIN — Medication 4 MILLIGRAM(S): at 03:38

## 2025-06-03 RX ADMIN — Medication 1000 MILLILITER(S): at 03:51

## 2025-06-03 NOTE — ED PROVIDER NOTE - PHYSICAL EXAMINATION
Physical Exam  GEN: Awake, alert, non-toxic appearing, NCAT  EYES: PERRL, full EOMI,  ENT: External inspection normal, normal voice, no oropharyngeal ulcerations/lesions/swelling  HEAD: atraumatic  NECK: FROM neck, supple, no meningismus, trachea midline, no JVD  CV:tachycardia   RESP: cta bl, no tachypnea, no hypoxia, no resp distress,  GI: +BS, Soft, nontender, no guarding/rebound,   MSK: FROM all 4 extremities, N/V intact,   SKIN: Color normal for race, warm and dry, no rash  NEURO: Oriented x3, CN 2-12 grossly intact, normal motor, normal sensory

## 2025-06-03 NOTE — ED ADULT TRIAGE NOTE - CHIEF COMPLAINT QUOTE
Pt BIBA c/o shortness of breath. States hx of afib, notes woke up with shortness of breath and nausea. Tachycardic in triage. Not compliant with metoprolol.

## 2025-06-03 NOTE — ED PROVIDER NOTE - ATTENDING APP SHARED VISIT CONTRIBUTION OF CARE
afib which broke on its own without meds in ED and HR came down from 113 to 60, patient nonadherent to her meds, follows up at [place], trop x 2 neg

## 2025-06-03 NOTE — ED PROVIDER NOTE - CLINICAL SUMMARY MEDICAL DECISION MAKING FREE TEXT BOX
40 y/o female PMHx of JASON.jeannine, s/p DCCV, CAD now here for sob and palpitations from home. arrives with ekg showing afib.   Plan: labs, ekg, CTA PE , IV and reassess     CTA PE negative. Will get serial trop

## 2025-06-03 NOTE — ED PROVIDER NOTE - PATIENT PORTAL LINK FT
You can access the FollowMyHealth Patient Portal offered by [facility] by registering at the following website: [email or web address]. By joining [employer]’s FollowMyHealth portal, you will also be able to view your health information using other applications (apps) compatible with our system.

## 2025-06-03 NOTE — ED ADULT NURSE NOTE - NSFALLUNIVINTERV_ED_ALL_ED
Bed/Stretcher in lowest position, wheels locked, appropriate side rails in place/Call bell, personal items and telephone in reach/Instruct patient to call for assistance before getting out of bed/chair/stretcher/Non-slip footwear applied when patient is off stretcher/[place] to call system/Physically safe environment - no spills, clutter or unnecessary equipment/Purposeful proactive rounding/Room/bathroom lighting operational, light cord in reach

## 2025-06-03 NOTE — ED PROVIDER NOTE - OBJECTIVE STATEMENT
Arrived to the [facility]. Maura completed. Provided education on side effects to report. Patient instructed to report any side affects to ordering provider. Patient tolerated well. Any issues or concerns during appointment: none. Discharged ambulatory.
38 y/o female  PMHx of [email or web address].[email or web address], s/p DCCV, CAD now here stating she has some sob. stating that she has a hx of afib and when she has an episode this is how it makes her feel. Patient is not currently on blood thinners. Denies chest pain,nausea,vomiting,diarrhea,fevers,chills.

## 2025-06-03 NOTE — ED PROVIDER NOTE - CARE PROVIDER_API CALL
Lewis Sarabia  Cardiovascular Disease  [address] [place] [address], [address]  [place] [address]  Phone: [phone]  Fax: [phone]  Follow Up Time: Urgent

## 2025-06-04 LAB
CULTURE RESULTS: SIGNIFICANT CHANGE UP
SPECIMEN SOURCE: SIGNIFICANT CHANGE UP

## 2025-07-22 NOTE — ED ADULT NURSE NOTE - CAS EDN DISCHARGE INTERVENTIONS
Patient calling back she is very upset that she had her urine culture done on 06/17/2025 and was called today with results.  She never took her ciprofloxacin on 06/19/2025 because she did not have symptoms.  Should she take it now?  Also, she is upset that he was never advised that her lidocaine PA was completed and approved.   
IV discontinued, cath removed intact